# Patient Record
Sex: FEMALE | Race: WHITE
[De-identification: names, ages, dates, MRNs, and addresses within clinical notes are randomized per-mention and may not be internally consistent; named-entity substitution may affect disease eponyms.]

---

## 2018-11-29 ENCOUNTER — HOSPITAL ENCOUNTER (EMERGENCY)
Dept: HOSPITAL 76 - ED | Age: 23
Discharge: HOME | End: 2018-11-29
Payer: COMMERCIAL

## 2018-11-29 VITALS — DIASTOLIC BLOOD PRESSURE: 74 MMHG | SYSTOLIC BLOOD PRESSURE: 119 MMHG

## 2018-11-29 DIAGNOSIS — J05.0: Primary | ICD-10-CM

## 2018-11-29 PROCEDURE — 87430 STREP A AG IA: CPT

## 2018-11-29 PROCEDURE — 87070 CULTURE OTHR SPECIMN AEROBIC: CPT

## 2018-11-29 PROCEDURE — 99283 EMERGENCY DEPT VISIT LOW MDM: CPT

## 2018-11-29 NOTE — ED PHYSICIAN DOCUMENTATION
PD HPI HEENT





- Stated complaint


Stated Complaint: SORE THROAT





- Chief complaint


Chief Complaint: Heent





- History obtained from


History obtained from: Patient





- History of Present Illness


Timing - onset: How many days ago (5 days ill, worse the past day or so)


Timing - duration: Days


Timing - details: Gradual onset, Still present (worse the past day)


Location: Sinuses, Throat


Associated symptoms: Congestion, Cough, Other (sore throat).  No: Fever, Swollen

nodes


Recently seen: Not recently seen





Review of Systems


Constitutional: reports: Myalgias, Fatigue.  denies: Fever, Chills


Nose: reports: Rhinorrhea / runny nose, Congestion


Throat: reports: Sore throat.  denies: Swollen tonsils


Respiratory: reports: Cough.  denies: Dyspnea


GI: denies: Nausea, Vomiting, Diarrhea


Skin: denies: Rash





PD PAST MEDICAL HISTORY





- Past Medical History


Past Medical History: No


Cardiovascular: None


Respiratory: None


Neuro: None


Endocrine/Autoimmune: None


GI: None


GYN: None


: None


HEENT: None


Psych: None


Musculoskeletal: None


Derm: None





- Past Surgical History


Past Surgical History: No





- Present Medications


Home Medications: 


                                Ambulatory Orders











 Medication  Instructions  Recorded  Confirmed


 


Benzonatate [Tessalon Perle] 100 - 200 mg PO TID PRN #30 capsule 11/29/18 


 


Dexamethasone [Decadron] 4 mg PO DAILY #5 tablet 11/29/18 


 


Naproxen 375 mg PO BID #20 tablet 11/29/18 














- Allergies


Allergies/Adverse Reactions: 


                                    Allergies











Allergy/AdvReac Type Severity Reaction Status Date / Time


 


No Known Drug Allergies Allergy   Verified 11/29/18 11:16














- Social History


Does the pt smoke?: No


Smoking Status: Never smoker


Does the pt drink ETOH?: No


Does the pt have substance abuse?: No





- Immunizations


Immunizations are current?: Yes





- POLST


Patient has POLST: No





PD ED PE NORMAL





- Vitals


Vital signs reviewed: Yes





- General


General: Alert and oriented X 3, Well developed/nourished





- HEENT


HEENT: Ears normal, Moist mucous membranes.  No: Pharynx benign (some redness 

and swelling of tonsils, but only moderate and no real exudate. Minimal anterior

 adenopathy. )





- Neck


Neck: Supple, no meningeal sign





- Cardiac


Cardiac: RRR, No murmur





- Respiratory


Respiratory: Clear bilaterally





- Abdomen


Abdomen: Soft, Non tender





- Derm


Derm: Normal color, Warm and dry, No rash





- Neuro


Neuro: Alert and oriented X 3, No motor deficit, Normal speech





Results





- Vitals


Vitals: 


                                     Oxygen











O2 Source                      Room air

















- Labs


Labs: 


                                  Microbiology











 11/29/18 11:17 Group A Strep Throat Culture - Final





 Throat    Beta Hemolytic Strep Group A








                                Laboratory Tests











  11/29/18





  11:17


 


Group A Strep Rapid  Negative














PD MEDICAL DECISION MAKING





- ED course


Complexity details: reviewed results (negative strep test), considered 

differential, d/w patient





Departure





- Departure


Disposition: 01 Home, Self Care


Clinical Impression: 


Upper respiratory infection


Qualifiers:


 URI type: croup Qualified Code(s): J05.0 - Acute obstructive laryngitis [croup]





Condition: Stable


Record reviewed to determine appropriate education?: Yes


Instructions:  ED Upper Resp Infec No  Abx Tx


Follow-Up: 


[facility] [Provider Group]


Prescriptions: 


Benzonatate [Tessalon Perle] 100 - 200 mg PO TID PRN #30 capsule


 PRN Reason: Cough


Dexamethasone [Decadron] 4 mg PO DAILY #5 tablet


Naproxen 375 mg PO BID #20 tablet


Comments: 


Rest today and tomorrow.  Drink lots of fluids.  Naproxen twice daily for the 

next 7-10 days.  Add Tylenol if needed for fevers or pains.  Decadron steroid 

will help with the inflammation through the throat and airway [place] coughing 

and less hurting.  Tessalon if needed for cough.  Recheck if not improving well 

over the next few days.


Forms:  Activity restrictions


Discharge Date/Time: 11/29/18 12:57

## 2019-07-03 ENCOUNTER — HOSPITAL ENCOUNTER (EMERGENCY)
Dept: HOSPITAL 76 - ED | Age: 24
Discharge: HOME | End: 2019-07-03
Payer: COMMERCIAL

## 2019-07-03 VITALS — SYSTOLIC BLOOD PRESSURE: 127 MMHG | DIASTOLIC BLOOD PRESSURE: 63 MMHG

## 2019-07-03 DIAGNOSIS — R19.7: ICD-10-CM

## 2019-07-03 DIAGNOSIS — R11.2: Primary | ICD-10-CM

## 2019-07-03 PROCEDURE — 99283 EMERGENCY DEPT VISIT LOW MDM: CPT

## 2019-07-03 NOTE — ED PHYSICIAN DOCUMENTATION
PD HPI NVD





- Stated complaint


Stated Complaint: N/V/D





- Chief complaint


Chief Complaint: Abd Pain





- History obtained from


History obtained from: Patient





- History of Present Illness


Timing - onset: Last night


Timing - duration: Days (1/2)


Timing - details: Abrupt onset, Still present


Associated symptoms: Abdominal pain (just muscular when vomiting; no consistent 

pains), Loss of appetite (The patient started with nausea and vomiting last 

night after dinner and this continued through the night.  He has tapered a 

little bit into this morning.  She has had some diarrhea as well.  She has 

muscular abdominal pain with vomiting but no ongoing abdominal pain.  She wanted

to rest at home today and went to [facility] to get a SI Q excuse but they 

were too busy and referred her to the ER.).  No: Near syncope / syncope, Weight 

loss, Dysuria


Contributing factors: No: Sick contact, Bad food, Recent antibiotics


Improved by: No: Vomiting


Worsened by: Eating


Similar symptoms before: Has not had sx before


Recently seen: Clinic (She went to [facility] but they were busy and said they

could not see her to give her first sick excuse and referred her to the ER.)





Review of Systems


Constitutional: reports: Myalgias.  denies: Fever, Chills


Nose: denies: Rhinorrhea / runny nose, Congestion


Throat: denies: Sore throat


Respiratory: denies: Cough


GI: reports: Nausea, Vomiting, Diarrhea.  denies: Hematemesis, Bloody / black 

stool


Skin: denies: Rash, Lesions


Neurologic: denies: Near syncope, Altered mental status, Headache





PD PAST MEDICAL HISTORY





- Past Medical History


Cardiovascular: None


Respiratory: None


Neuro: None


Endocrine/Autoimmune: None


GI: None


GYN: None


: None


HEENT: None


Psych: None


Musculoskeletal: None


Derm: None





- Past Surgical History


Past Surgical History: No





- Present Medications


Home Medications: 


                                Ambulatory Orders











 Medication  Instructions  Recorded  Confirmed


 


Diphenoxylate/Atropine [Lomotil] 1 each PO QID PRN #12 tablet 07/03/19 


 


Ondansetron Odt [Zofran] 4 mg TL Q6H PRN #10 tablet 07/03/19 


 


RX: Naproxen 375 mg PO BID #20 tablet 07/03/19 














- Allergies


Allergies/Adverse Reactions: 


                                    Allergies











Allergy/AdvReac Type Severity Reaction Status Date / Time


 


No Known Drug Allergies Allergy   Verified 07/03/19 08:02














- Social History


Does the pt smoke?: No


Smoking Status: Never smoker


Does the pt drink ETOH?: No


Does the pt have substance abuse?: No





- Immunizations


Immunizations are current?: Yes





- POLST


Patient has POLST: No





PD ED PE NORMAL





- Vitals


Vital signs reviewed: Yes





- General


General: Alert and oriented X 3, No acute distress, Well developed/nourished





- HEENT


HEENT: Pharynx benign.  No: Moist mucous membranes





- Neck


Neck: Supple, no meningeal sign, No adenopathy





- Cardiac


Cardiac: RRR, No murmur





- Respiratory


Respiratory: Clear bilaterally





- Abdomen


Abdomen: Soft, Non tender, Non distended, No organomegaly.  No: Normal bowel 

sounds (increased)





- Back


Back: No CVA TTP





- Derm


Derm: Normal color, Warm and dry





- Neuro


Neuro: Alert and oriented X 3, No motor deficit, Normal speech





Results





- Vitals


Vitals: 


                               Vital Signs - 24 hr











  07/03/19





  08:00


 


Temperature 36.5 C


 


Heart Rate 84


 


Respiratory 16





Rate 


 


Blood Pressure 127/63


 


O2 Saturation 99








                                     Oxygen











O2 Source                      Room air

















PD MEDICAL DECISION MAKING





- ED course


Complexity details: considered differential (Sounds likely to be a viral 

gastroenteritis or food poisoning.  She has no abdominal tenderness.  The 

patient does not feel dehydrated enough to warrant IVs as offered.  She would 

prefer oral medications and being able to go home and rest.  We can do that.), 

d/w patient





Departure





- Departure


Disposition: 01 Home, Self Care


Clinical Impression: 


 Nausea vomiting and diarrhea





Condition: Stable


Record reviewed to determine appropriate education?: Yes


Health Concerns: 


vomiting and diarrhea


Plan of Treatment: 


medications and time


Care Goals: 


improve symptoms


Assessment: 


likely viral GE


Instructions:  ED Gastroenteritis Vs Food Poison


Follow-Up: 


[facility] [Provider Group]


Prescriptions: 


Diphenoxylate/Atropine [Lomotil] 1 each PO QID PRN #12 tablet


 PRN Reason: Diarrhea


RX: Naproxen 375 mg PO BID #20 tablet


Ondansetron Odt [Zofran] 4 mg TL Q6H PRN #10 tablet


 PRN Reason: Nausea / Vomiting


Comments: 


Off work today. Meds for nausea and diarrhea as needed. Naproxen for 

pains/fevers. 


Forms:  Activity restrictions


Discharge Date/Time: 07/03/19 08:40

## 2021-03-07 ENCOUNTER — HOSPITAL ENCOUNTER (EMERGENCY)
Dept: HOSPITAL 76 - ED | Age: 26
Discharge: HOME | End: 2021-03-07
Payer: COMMERCIAL

## 2021-03-07 VITALS — SYSTOLIC BLOOD PRESSURE: 110 MMHG | DIASTOLIC BLOOD PRESSURE: 66 MMHG

## 2021-03-07 DIAGNOSIS — O21.1: Primary | ICD-10-CM

## 2021-03-07 DIAGNOSIS — Z3A.01: ICD-10-CM

## 2021-03-07 LAB
ALBUMIN DIAFP-MCNC: 4.3 G/DL (ref 3.2–5.5)
ALBUMIN/GLOB SERPL: 1.5 {RATIO} (ref 1–2.2)
ALP SERPL-CCNC: 40 IU/L (ref 42–121)
ALT SERPL W P-5'-P-CCNC: 14 IU/L (ref 10–60)
ANION GAP SERPL CALCULATED.4IONS-SCNC: 9 MMOL/L (ref 6–13)
AST SERPL W P-5'-P-CCNC: 14 IU/L (ref 10–42)
BASOPHILS NFR BLD AUTO: 0 10^3/UL (ref 0–0.1)
BASOPHILS NFR BLD AUTO: 0.3 %
BILIRUB BLD-MCNC: 0.7 MG/DL (ref 0.2–1)
BUN SERPL-MCNC: 11 MG/DL (ref 6–20)
CALCIUM UR-MCNC: 9.3 MG/DL (ref 8.5–10.3)
CHLORIDE SERPL-SCNC: 101 MMOL/L (ref 101–111)
CLARITY UR REFRACT.AUTO: CLEAR
CO2 SERPL-SCNC: 23 MMOL/L (ref 21–32)
CREAT SERPLBLD-SCNC: 0.6 MG/DL (ref 0.4–1)
EOSINOPHIL # BLD AUTO: 0.1 10^3/UL (ref 0–0.7)
EOSINOPHIL NFR BLD AUTO: 1 %
ERYTHROCYTE [DISTWIDTH] IN BLOOD BY AUTOMATED COUNT: 12.2 % (ref 12–15)
GFRSERPLBLD MDRD-ARVRAT: 123 ML/MIN/{1.73_M2} (ref 89–?)
GLOBULIN SER-MCNC: 2.9 G/DL (ref 2.1–4.2)
GLUCOSE SERPL-MCNC: 91 MG/DL (ref 70–100)
GLUCOSE UR QL STRIP.AUTO: NEGATIVE MG/DL
HCT VFR BLD AUTO: 40 % (ref 37–47)
HGB UR QL STRIP: 13.7 G/DL (ref 12–16)
KETONES UR QL STRIP.AUTO: NEGATIVE MG/DL
LIPASE SERPL-CCNC: 28 U/L (ref 22–51)
LYMPHOCYTES # SPEC AUTO: 2.5 10^3/UL (ref 1.5–3.5)
LYMPHOCYTES NFR BLD AUTO: 25.3 %
MCH RBC QN AUTO: 30.6 PG (ref 27–31)
MCHC RBC AUTO-ENTMCNC: 34.3 G/DL (ref 32–36)
MCV RBC AUTO: 89.5 FL (ref 81–99)
MONOCYTES # BLD AUTO: 0.6 10^3/UL (ref 0–1)
MONOCYTES NFR BLD AUTO: 6 %
NEUTROPHILS # BLD AUTO: 6.6 10^3/UL (ref 1.5–6.6)
NEUTROPHILS # SNV AUTO: 9.8 X10^3/UL (ref 4.8–10.8)
NEUTROPHILS NFR BLD AUTO: 67 %
NITRITE UR QL STRIP.AUTO: NEGATIVE
NRBC # BLD AUTO: 0 /100WBC
NRBC # BLD AUTO: 0 X10^3/UL
PDW BLD AUTO: 10.1 FL (ref 7.9–10.8)
PH UR STRIP.AUTO: 6.5 PH (ref 5–7.5)
PLATELET # BLD: 285 10^3/UL (ref 130–450)
POTASSIUM SERPL-SCNC: 3.9 MMOL/L (ref 3.5–5)
PROT SPEC-MCNC: 7.2 G/DL (ref 6.7–8.2)
PROT UR STRIP.AUTO-MCNC: NEGATIVE MG/DL
RBC # UR STRIP.AUTO: (no result) /UL
RBC MAR: 4.47 10^6/UL (ref 4.2–5.4)
SODIUM SERPLBLD-SCNC: 133 MMOL/L (ref 135–145)
SP GR UR STRIP.AUTO: 1.01 (ref 1–1.03)
UROBILINOGEN UR QL STRIP.AUTO: (no result) E.U./DL
UROBILINOGEN UR STRIP.AUTO-MCNC: NEGATIVE MG/DL

## 2021-03-07 PROCEDURE — 80053 COMPREHEN METABOLIC PANEL: CPT

## 2021-03-07 PROCEDURE — 87086 URINE CULTURE/COLONY COUNT: CPT

## 2021-03-07 PROCEDURE — 99284 EMERGENCY DEPT VISIT MOD MDM: CPT

## 2021-03-07 PROCEDURE — 96374 THER/PROPH/DIAG INJ IV PUSH: CPT

## 2021-03-07 PROCEDURE — 81025 URINE PREGNANCY TEST: CPT

## 2021-03-07 PROCEDURE — 81003 URINALYSIS AUTO W/O SCOPE: CPT

## 2021-03-07 PROCEDURE — 99283 EMERGENCY DEPT VISIT LOW MDM: CPT

## 2021-03-07 PROCEDURE — 85025 COMPLETE CBC W/AUTO DIFF WBC: CPT

## 2021-03-07 PROCEDURE — 81001 URINALYSIS AUTO W/SCOPE: CPT

## 2021-03-07 PROCEDURE — 83690 ASSAY OF LIPASE: CPT

## 2021-03-07 PROCEDURE — 36415 COLL VENOUS BLD VENIPUNCTURE: CPT

## 2021-03-07 NOTE — ED PHYSICIAN DOCUMENTATION
PD HPI ABD PAIN





- Stated complaint


Stated Complaint: VOMITING FOR 2 WEEKS





- Chief complaint


Chief Complaint: Abd Pain





- History obtained from


History obtained from: Patient (25-year-old G3, P0 at 7 weeks gestation presents

with 2 weeks of worsening vomiting and nausea.  Developed some left upper 

quadrant pain today.  No pelvic pain, cramping or bleeding.  She has had similar

problems with prior pregnancies.)





Review of Systems


Ten Systems: 10 systems reviewed and negative


Constitutional: denies: Fever, Chills


Cardiac: denies: Chest pain / pressure, Palpitations


Respiratory: denies: Dyspnea, Cough





PD PAST MEDICAL HISTORY





- Past Medical History


Cardiovascular: None


Respiratory: None


Neuro: None


Endocrine/Autoimmune: None


GI: None


GYN: None


: None


HEENT: None


Psych: None


Musculoskeletal: None


Derm: None





- Past Surgical History


Past Surgical History: No





- Present Medications


Home Medications: 


                                Ambulatory Orders











 Medication  Instructions  Recorded  Confirmed


 


Diphenoxylate/Atropine [Lomotil] 1 each PO QID PRN #12 tablet 07/03/19 


 


Naproxen 375 mg PO BID #20 tablet 07/03/19 


 


Ondansetron Odt [Zofran] 4 mg TL Q6H PRN #10 tablet 07/03/19 


 


Metoclopramide [Reglan] 10 mg PO Q6H PRN #20 tablet 03/07/21 














- Allergies


Allergies/Adverse Reactions: 


                                    Allergies











Allergy/AdvReac Type Severity Reaction Status Date / Time


 


No Known Drug Allergies Allergy   Verified 07/03/19 08:02














- Social History


Does the pt smoke?: No


Smoking Status: Never smoker


Does the pt drink ETOH?: No


Does the pt have substance abuse?: No





- Immunizations


Immunizations are current?: Yes





- POLST


Patient has POLST: No





PD ED PE NORMAL





- Vitals


Vital signs reviewed: Yes





- General


General: Alert and oriented X 3, No acute distress





- HEENT


HEENT: PERRL, EOMI





- Neck


Neck: Supple, no meningeal sign, No bony TTP





- Cardiac


Cardiac: RRR, No murmur





- Respiratory


Respiratory: No respiratory distress, Clear bilaterally





- Abdomen


Abdomen: Normal bowel sounds, Soft, Non tender, Other (Bedside ultrasound 

demonstrates single live intrauterine pregnancy with heart rate of 146.)





- Back


Back: No CVA TTP, No spinal TTP





- Derm


Derm: Normal color, Warm and dry





- Extremities


Extremities: No edema, No calf tenderness / cord





- Neuro


Neuro: Alert and oriented X 3, Normal speech





Results





- Vitals


Vitals: 


                               Vital Signs - 24 hr











  03/07/21





  18:00


 


Temperature 37 C


 


Heart Rate 85


 


Respiratory 16





Rate 


 


Blood Pressure 125/66


 


O2 Saturation 100








                                     Oxygen











O2 Source                      Room air

















- Labs


Labs: 


                                Laboratory Tests











  03/07/21 03/07/21 03/07/21





  18:21 18:21 18:21


 


WBC  9.8  


 


RBC  4.47  


 


Hgb  13.7  


 


Hct  40.0  


 


MCV  89.5  


 


MCH  30.6  


 


MCHC  34.3  


 


RDW  12.2  


 


Plt Count  285  


 


MPV  10.1  


 


Neut # (Auto)  6.6  


 


Lymph # (Auto)  2.5  


 


Mono # (Auto)  0.6  


 


Eos # (Auto)  0.1  


 


Baso # (Auto)  0.0  


 


Absolute Nucleated RBC  0.00  


 


Nucleated RBC %  0.0  


 


Sodium   133 L 


 


Potassium   3.9 


 


Chloride   101 


 


Carbon Dioxide   23 


 


Anion Gap   9.0 


 


BUN   11 


 


Creatinine   0.6 


 


Estimated GFR (MDRD)   123 


 


Glucose   91 


 


Calcium   9.3 


 


Total Bilirubin   0.7 


 


AST   14 


 


ALT   14 


 


Alkaline Phosphatase   40 L 


 


Total Protein   7.2 


 


Albumin   4.3 


 


Globulin   2.9 


 


Albumin/Globulin Ratio   1.5 


 


Lipase   28 


 


Urine Color    YELLOW


 


Urine Clarity    CLEAR


 


Urine pH    6.5


 


Ur Specific Gravity    1.015


 


Urine Protein    NEGATIVE


 


Urine Glucose (UA)    NEGATIVE


 


Urine Ketones    NEGATIVE


 


Urine Occult Blood    TRACE-INTA


 


Urine Nitrite    NEGATIVE


 


Urine Bilirubin    NEGATIVE


 


Urine Urobilinogen    0.2 (NORMAL)


 


Ur Leukocyte Esterase    NEGATIVE


 


Ur Microscopic Review    NOT INDICATED


 


Urine Culture Comments    NOT INDICATED














PD MEDICAL DECISION MAKING





- ED course


ED course: 





25-year-old woman presents with hyperemesis gravidarum.  She has some left upper

 quadrant pain which I suspect is from retching given benign exam.  No pelvic 

pain, bleeding, fluid loss.  Reassuring bedside ultrasound.  After the 

administration of IV fluids and Reglan she was symptom-free.





Departure





- Departure


Disposition: 01 Home, Self Care


Clinical Impression: 


 Hyperemesis gravidarum, Dehydration





Condition: Good


Record reviewed to determine appropriate education?: Yes


Instructions:  ED Preg Morning Sickness


Prescriptions: 


Metoclopramide [Reglan] 10 mg PO Q6H PRN #20 tablet


 PRN Reason: nausea or headache


Comments: 


Call your doctor to arrange a follow-up appointment, make the next available 

appointment.  In the interim, return anytime if worse or if new symptoms 

develop.

## 2021-03-18 ENCOUNTER — HOSPITAL ENCOUNTER (EMERGENCY)
Dept: HOSPITAL 76 - ED | Age: 26
LOS: 1 days | Discharge: HOME | End: 2021-03-19
Payer: COMMERCIAL

## 2021-03-18 DIAGNOSIS — Z3A.00: ICD-10-CM

## 2021-03-18 DIAGNOSIS — O21.0: Primary | ICD-10-CM

## 2021-03-18 LAB
ALBUMIN DIAFP-MCNC: 3.7 G/DL (ref 3.2–5.5)
ALBUMIN/GLOB SERPL: 1.5 {RATIO} (ref 1–2.2)
ALP SERPL-CCNC: 39 IU/L (ref 42–121)
ALT SERPL W P-5'-P-CCNC: 15 IU/L (ref 10–60)
ANION GAP SERPL CALCULATED.4IONS-SCNC: 12 MMOL/L (ref 6–13)
AST SERPL W P-5'-P-CCNC: 16 IU/L (ref 10–42)
BASOPHILS NFR BLD AUTO: 0 10^3/UL (ref 0–0.1)
BASOPHILS NFR BLD AUTO: 0.3 %
BILIRUB BLD-MCNC: 0.5 MG/DL (ref 0.2–1)
BUN SERPL-MCNC: 9 MG/DL (ref 6–20)
CALCIUM UR-MCNC: 8.8 MG/DL (ref 8.5–10.3)
CHLORIDE SERPL-SCNC: 105 MMOL/L (ref 101–111)
CLARITY UR REFRACT.AUTO: CLEAR
CO2 SERPL-SCNC: 20 MMOL/L (ref 21–32)
CREAT SERPLBLD-SCNC: 0.6 MG/DL (ref 0.4–1)
EOSINOPHIL # BLD AUTO: 0.1 10^3/UL (ref 0–0.7)
EOSINOPHIL NFR BLD AUTO: 0.8 %
ERYTHROCYTE [DISTWIDTH] IN BLOOD BY AUTOMATED COUNT: 12.3 % (ref 12–15)
GFRSERPLBLD MDRD-ARVRAT: 123 ML/MIN/{1.73_M2} (ref 89–?)
GLOBULIN SER-MCNC: 2.5 G/DL (ref 2.1–4.2)
GLUCOSE SERPL-MCNC: 89 MG/DL (ref 70–100)
GLUCOSE UR QL STRIP.AUTO: NEGATIVE MG/DL
HCT VFR BLD AUTO: 37.7 % (ref 37–47)
HGB UR QL STRIP: 12.9 G/DL (ref 12–16)
KETONES UR QL STRIP.AUTO: NEGATIVE MG/DL
LIPASE SERPL-CCNC: 24 U/L (ref 22–51)
LYMPHOCYTES # SPEC AUTO: 1.9 10^3/UL (ref 1.5–3.5)
LYMPHOCYTES NFR BLD AUTO: 18.8 %
MCH RBC QN AUTO: 31 PG (ref 27–31)
MCHC RBC AUTO-ENTMCNC: 34.2 G/DL (ref 32–36)
MCV RBC AUTO: 90.6 FL (ref 81–99)
MONOCYTES # BLD AUTO: 0.5 10^3/UL (ref 0–1)
MONOCYTES NFR BLD AUTO: 5.5 %
NEUTROPHILS # BLD AUTO: 7.3 10^3/UL (ref 1.5–6.6)
NEUTROPHILS # SNV AUTO: 9.9 X10^3/UL (ref 4.8–10.8)
NEUTROPHILS NFR BLD AUTO: 74.3 %
NITRITE UR QL STRIP.AUTO: NEGATIVE
NRBC # BLD AUTO: 0 /100WBC
NRBC # BLD AUTO: 0 X10^3/UL
PDW BLD AUTO: 10.3 FL (ref 7.9–10.8)
PH UR STRIP.AUTO: 8.5 PH (ref 5–7.5)
PLATELET # BLD: 261 10^3/UL (ref 130–450)
POTASSIUM SERPL-SCNC: 3.8 MMOL/L (ref 3.5–5)
PROT SPEC-MCNC: 6.2 G/DL (ref 6.7–8.2)
PROT UR STRIP.AUTO-MCNC: NEGATIVE MG/DL
RBC # UR STRIP.AUTO: NEGATIVE /UL
RBC MAR: 4.16 10^6/UL (ref 4.2–5.4)
SODIUM SERPLBLD-SCNC: 137 MMOL/L (ref 135–145)
SP GR UR STRIP.AUTO: 1.02 (ref 1–1.03)
UROBILINOGEN UR QL STRIP.AUTO: (no result) E.U./DL
UROBILINOGEN UR STRIP.AUTO-MCNC: NEGATIVE MG/DL

## 2021-03-18 PROCEDURE — 99283 EMERGENCY DEPT VISIT LOW MDM: CPT

## 2021-03-18 PROCEDURE — 81003 URINALYSIS AUTO W/O SCOPE: CPT

## 2021-03-18 PROCEDURE — 96361 HYDRATE IV INFUSION ADD-ON: CPT

## 2021-03-18 PROCEDURE — 83690 ASSAY OF LIPASE: CPT

## 2021-03-18 PROCEDURE — 80053 COMPREHEN METABOLIC PANEL: CPT

## 2021-03-18 PROCEDURE — 87086 URINE CULTURE/COLONY COUNT: CPT

## 2021-03-18 PROCEDURE — 81001 URINALYSIS AUTO W/SCOPE: CPT

## 2021-03-18 PROCEDURE — 96375 TX/PRO/DX INJ NEW DRUG ADDON: CPT

## 2021-03-18 PROCEDURE — 96374 THER/PROPH/DIAG INJ IV PUSH: CPT

## 2021-03-18 PROCEDURE — 85025 COMPLETE CBC W/AUTO DIFF WBC: CPT

## 2021-03-18 NOTE — ED PHYSICIAN DOCUMENTATION
History of Present Illness





- Stated complaint


Stated Complaint: NAUSEA





- Chief complaint


Chief Complaint: Abd Pain





- Additonal information


Additional information: 


25-year-old female presents the emergency department for uncontrolled nausea and

vomiting in first trimester of pregnancy.





LMP 1/7/2021.  G3, PO  Patient is scheduled to see the OB clinic tomorrow to 

establish care.  Patient reports that she has lost both of her previous 2 preg

nancies secondary to hyperemesis gravidarum.





She reports that with each of her pregnancies she has had severe nausea and 

vomiting.  She was seen for similar 03/07/21 by my colleague.  Screening labs 

were obtained.  Symptom control was achieved in the emergency department with 

Reglan and she was given a prescription on discharge.  She reports that the 

Reglan did not help with the nausea at home and she did follow-up with the [facility] 

women's clinic where they prescribed Zofran ODT.  She had been doing okay on 

Zofran until this evening when the vomiting began again.  She is concerned 

because she is vomited up bright red blood and coffee-ground.  She denies 

vaginal bleeding or discharge but does report that her urine smells bad.








Review of Systems


Constitutional: denies: Fever


Eyes: reports: Reviewed and negative


Ears: reports: Reviewed and negative


Nose: reports: Reviewed and negative


Throat: reports: Reviewed and negative


Cardiac: reports: Reviewed and negative


Respiratory: denies: Dyspnea, Cough


GI: reports: Nausea, Vomiting.  denies: Constipation, Diarrhea, Hematemesis


: reports: LMP (01/07/21).  denies: Dysuria, Discharge, Vaginal bleeding


Skin: denies: Rash, Lesions


Musculoskeletal: denies: Neck pain, Back pain





PD PAST MEDICAL HISTORY





- Past Medical History


Cardiovascular: None


Respiratory: None


Neuro: None


Endocrine/Autoimmune: None


GI: None


GYN: None


: None


HEENT: None


Psych: None


Musculoskeletal: None


Derm: None





- Past Surgical History


Past Surgical History: No





- Present Medications


Home Medications: 


                                Ambulatory Orders











 Medication  Instructions  Recorded  Confirmed


 


Ondansetron Odt [Zofran] 4 mg TL Q6H PRN #10 tablet 07/03/19 03/18/21














- Allergies


Allergies/Adverse Reactions: 


                                    Allergies











Allergy/AdvReac Type Severity Reaction Status Date / Time


 


Penicillins Allergy  Anaphylaxis Verified 03/18/21 21:12














- Social History


Does the pt smoke?: No


Smoking Status: Never smoker


Does the pt drink ETOH?: No


Does the pt have substance abuse?: No





- Immunizations


Immunizations are current?: Yes





- POLST


Patient has POLST: No





PD ED PE EXPANDED





- General


General: Alert, In distress (nauseated and actively vomiting)





- HEENT


HEENT: Atraumatic, PERRL, Dry mucous membranes





- Neck


Neck: Supple w/out meningeal sx.  No: Adenopathy





- Cardiac


Cardiac: Regular Rate, Regular Rhythm, Radial strong equal, Cap refill < 2 sec. 

 No: Murmur Present





- Respiratory


Respiratory: Clear to ausultation ranjeet.  No: Distress, Labored





- Abdomen


Abdomen: Normal Bowel sounds.  No: Tender to palpation (Generalized nonfocal 

tenderness.)





- Derm


Derm: Normal color, Warm and dry





- Neuro


Neuro: Alert and Oriented X 3, CNII-XII intact





- GCS


Eye Opening: Spontaneous


Motor: Obeys Commands


Verbal: Oriented


Total: 15





Results





- Vitals


Vitals: 


                               Vital Signs - 24 hr











  03/18/21 03/18/21





  21:09 21:12


 


Temperature 37.3 C 37.3 C


 


Heart Rate 89 89


 


Respiratory 16 16





Rate  


 


Blood Pressure 124/59 L 124/60


 


O2 Saturation 100 100








                                     Oxygen











O2 Source                      Room air

















- Labs


Labs: 


                                Laboratory Tests











  03/18/21 03/18/21





  21:38 22:00


 


WBC   9.9


 


RBC   4.16 L


 


Hgb   12.9


 


Hct   37.7


 


MCV   90.6


 


MCH   31.0


 


MCHC   34.2


 


RDW   12.3


 


Plt Count   261


 


MPV   10.3


 


Neut # (Auto)   7.3 H


 


Lymph # (Auto)   1.9


 


Mono # (Auto)   0.5


 


Eos # (Auto)   0.1


 


Baso # (Auto)   0.0


 


Absolute Nucleated RBC   0.00


 


Nucleated RBC %   0.0


 


Urine Color  YELLOW 


 


Urine Clarity  CLEAR 


 


Urine pH  8.5 H 


 


Ur Specific Gravity  1.020 


 


Urine Protein  NEGATIVE 


 


Urine Glucose (UA)  NEGATIVE 


 


Urine Ketones  NEGATIVE 


 


Urine Occult Blood  NEGATIVE 


 


Urine Nitrite  NEGATIVE 


 


Urine Bilirubin  NEGATIVE 


 


Urine Urobilinogen  0.2 (NORMAL) 


 


Ur Leukocyte Esterase  NEGATIVE 


 


Ur Microscopic Review  NOT INDICATED 


 


Urine Culture Comments  NOT INDICATED 














PD MEDICAL DECISION MAKING





- ED course


Complexity details: reviewed results, re-evaluated patient, d/w patient


ED course: 





25-year-old female presents the emergency department with uncontrolled nausea 

and vomiting in first trimester pregnancy.  She has been taking Reglan and 

Zofran at home without relief of symptoms.  Her nausea is especially bad when 

standing or ambulating at all.  Screening labs are pending for this patient but 

I have initiated treatment with 2 L of crystalloid as well as Zofran and Reglan.

  Patient reports that the initial dose of Zofran has helped her symptoms but 

she has a lot of throat pain from retching forcefully.  I will order viscous 

lidocaine.





Patient will be signed out to my nocturnal colleague Dr. Choudhury to follow-up on 

screening labs and electrolytes and reevaluate symptoms.





Departure





- Departure


Clinical Impression: 


 Hyperemesis gravidarum

## 2021-03-19 ENCOUNTER — HOSPITAL ENCOUNTER (OUTPATIENT)
Dept: HOSPITAL 76 - LAB.R | Age: 26
Discharge: HOME | End: 2021-03-19
Attending: ADVANCED PRACTICE MIDWIFE
Payer: COMMERCIAL

## 2021-03-19 VITALS — DIASTOLIC BLOOD PRESSURE: 60 MMHG | SYSTOLIC BLOOD PRESSURE: 110 MMHG

## 2021-03-19 DIAGNOSIS — N89.8: ICD-10-CM

## 2021-03-19 DIAGNOSIS — Z32.01: Primary | ICD-10-CM

## 2021-03-19 LAB
AMPHET UR QL SCN: NEGATIVE
BARBITURATES UR QL SCN>300 NG/ML: NEGATIVE
BENZODIAZ UR QL SCN: NEGATIVE
BV DNA PNL VAG NAA+PROBE: POSITIVE
C GLABRATA DNA BLD QL NAA+PROBE: NEGATIVE
C KRUSEI DNA VAG QL NAA+PROBE: NEGATIVE
CANDIDA DNA VAG QL NAA+PROBE: NEGATIVE
CLARITY UR REFRACT.AUTO: CLEAR
COCAINE UR-SCNC: NEGATIVE UMOL/L
GLUCOSE UR QL STRIP.AUTO: NEGATIVE MG/DL
KETONES UR QL STRIP.AUTO: NEGATIVE MG/DL
METHADONE UR QL SCN: NEGATIVE
METHAMPHET UR QL SCN: NEGATIVE
NITRITE UR QL STRIP.AUTO: NEGATIVE
OPIATES UR QL SCN: NEGATIVE
PH UR STRIP.AUTO: 7.5 PH (ref 5–7.5)
PROT UR STRIP.AUTO-MCNC: NEGATIVE MG/DL
RBC # UR STRIP.AUTO: NEGATIVE /UL
SP GR UR STRIP.AUTO: 1.02 (ref 1–1.03)
SQUAMOUS URNS QL MICRO: (no result)
T VAGINALIS RRNA GENITAL QL PROBE: NEGATIVE
THC UR QL SCN: NEGATIVE
UROBILINOGEN UR QL STRIP.AUTO: (no result) E.U./DL
UROBILINOGEN UR STRIP.AUTO-MCNC: NEGATIVE MG/DL
VOLATILE DRUGS POS SERPL SCN: (no result)
WBC # UR MANUAL: (no result) /HPF (ref 0–5)

## 2021-03-19 PROCEDURE — 81001 URINALYSIS AUTO W/SCOPE: CPT

## 2021-03-19 PROCEDURE — 87661 TRICHOMONAS VAGINALIS AMPLIF: CPT

## 2021-03-19 PROCEDURE — 80306 DRUG TEST PRSMV INSTRMNT: CPT

## 2021-03-19 PROCEDURE — 87086 URINE CULTURE/COLONY COUNT: CPT

## 2021-03-19 PROCEDURE — 87801 DETECT AGNT MULT DNA AMPLI: CPT

## 2021-03-22 ENCOUNTER — HOSPITAL ENCOUNTER (OUTPATIENT)
Dept: HOSPITAL 76 - DI | Age: 26
Discharge: HOME | End: 2021-03-22
Attending: ADVANCED PRACTICE MIDWIFE
Payer: COMMERCIAL

## 2021-03-22 DIAGNOSIS — Z32.01: Primary | ICD-10-CM

## 2021-03-22 NOTE — ULTRASOUND REPORT
PROCEDURE:  OB First Trimester

 

INDICATIONS:  POS PREGNANCY TEST

 

OUTSIDE/PRIOR DATING DATA:  

Last menstrual period (LMP): 1/7/2021.  

LMP-based estimated date of delivery (ROHINI): 10/14/2021.  

First dating scan (date and location): 5/22/2021.  

Estimated date of delivery (ROHINI) from first dating scan: 10/18/2021.  

 

TECHNIQUE:  

Real-time scanning was performed of the fetus and maternal pelvic organs, with image documentation.  


 

COMPARISON:  None

 

FINDINGS:     

 

Embryo:  Signal of intrauterine pregnancy is identified with crown-rump length measuring 2.1 cm corre
sponding to 10 weeks 0 days. Fetal heart rate is identified at 169 bpm.

 

Measurement variability in dating:  +/- 4 weeks by LMP, +/- 7 days by mean sac diameter (use before 6
 weeks gestation if crown-rump length not able to be measured), +/- 5 days by crown-rump length (6-12
 weeks gestation).  

 

Maternal organs:  Ovaries are within normal limits..  

 

IMPRESSION:  

Single live intrauterine pregnancy with ultrasound gestational age of 10 weeks 0 days.

 

 

Reviewed by: Shey Conn MD on 3/22/2021 2:01 PM PDT

Approved by: Shey Conn MD on 3/22/2021 2:01 PM PDT

 

 

Station ID:  [id number]

## 2021-04-14 ENCOUNTER — HOSPITAL ENCOUNTER (OUTPATIENT)
Dept: HOSPITAL 76 - LAB.R | Age: 26
Discharge: HOME | End: 2021-04-14
Attending: ADVANCED PRACTICE MIDWIFE
Payer: COMMERCIAL

## 2021-04-14 DIAGNOSIS — Z34.90: Primary | ICD-10-CM

## 2021-04-14 LAB
C TRACH DNA SPEC NAA+PROBE-ACNC: NEGATIVE
N GONORRHOEA DNA GENITAL QL NAA+PROBE: NEGATIVE
T VAGINALIS RRNA GENITAL QL PROBE: NEGATIVE

## 2021-04-14 PROCEDURE — 87591 N.GONORRHOEAE DNA AMP PROB: CPT

## 2021-04-14 PROCEDURE — 87491 CHLMYD TRACH DNA AMP PROBE: CPT

## 2021-04-14 PROCEDURE — 87661 TRICHOMONAS VAGINALIS AMPLIF: CPT

## 2021-05-12 ENCOUNTER — HOSPITAL ENCOUNTER (OUTPATIENT)
Dept: HOSPITAL 76 - LAB | Age: 26
Discharge: HOME | End: 2021-05-12
Attending: RADIOLOGY
Payer: COMMERCIAL

## 2021-05-12 DIAGNOSIS — Z36.89: ICD-10-CM

## 2021-05-12 DIAGNOSIS — Z36.0: ICD-10-CM

## 2021-05-12 DIAGNOSIS — Z34.90: Primary | ICD-10-CM

## 2021-05-12 LAB
BASOPHILS NFR BLD AUTO: 0 10^3/UL (ref 0–0.1)
BASOPHILS NFR BLD AUTO: 0.2 %
EOSINOPHIL # BLD AUTO: 0 10^3/UL (ref 0–0.7)
EOSINOPHIL NFR BLD AUTO: 0.5 %
ERYTHROCYTE [DISTWIDTH] IN BLOOD BY AUTOMATED COUNT: 13.4 % (ref 12–15)
HCT VFR BLD AUTO: 37.6 % (ref 37–47)
HGB UR QL STRIP: 13 G/DL (ref 12–16)
LYMPHOCYTES # SPEC AUTO: 1.9 10^3/UL (ref 1.5–3.5)
LYMPHOCYTES NFR BLD AUTO: 22.8 %
MCH RBC QN AUTO: 31.1 PG (ref 27–31)
MCHC RBC AUTO-ENTMCNC: 34.6 G/DL (ref 32–36)
MCV RBC AUTO: 90 FL (ref 81–99)
MONOCYTES # BLD AUTO: 0.4 10^3/UL (ref 0–1)
MONOCYTES NFR BLD AUTO: 4.9 %
NEUTROPHILS # BLD AUTO: 5.8 10^3/UL (ref 1.5–6.6)
NEUTROPHILS # SNV AUTO: 8.2 X10^3/UL (ref 4.8–10.8)
NEUTROPHILS NFR BLD AUTO: 71.4 %
NRBC # BLD AUTO: 0 /100WBC
NRBC # BLD AUTO: 0 X10^3/UL
PDW BLD AUTO: 10 FL (ref 7.9–10.8)
PLATELET # BLD: 296 10^3/UL (ref 130–450)
RBC MAR: 4.18 10^6/UL (ref 4.2–5.4)

## 2021-05-12 PROCEDURE — 86592 SYPHILIS TEST NON-TREP QUAL: CPT

## 2021-05-12 PROCEDURE — 86850 RBC ANTIBODY SCREEN: CPT

## 2021-05-12 PROCEDURE — 36415 COLL VENOUS BLD VENIPUNCTURE: CPT

## 2021-05-12 PROCEDURE — 86762 RUBELLA ANTIBODY: CPT

## 2021-05-12 PROCEDURE — 87661 TRICHOMONAS VAGINALIS AMPLIF: CPT

## 2021-05-12 PROCEDURE — 86901 BLOOD TYPING SEROLOGIC RH(D): CPT

## 2021-05-12 PROCEDURE — 86803 HEPATITIS C AB TEST: CPT

## 2021-05-12 PROCEDURE — 81511 FTL CGEN ABNOR FOUR ANAL: CPT

## 2021-05-12 PROCEDURE — 85025 COMPLETE CBC W/AUTO DIFF WBC: CPT

## 2021-05-12 PROCEDURE — 86787 VARICELLA-ZOSTER ANTIBODY: CPT

## 2021-05-12 PROCEDURE — 87591 N.GONORRHOEAE DNA AMP PROB: CPT

## 2021-05-12 PROCEDURE — 87389 HIV-1 AG W/HIV-1&-2 AB AG IA: CPT

## 2021-05-12 PROCEDURE — 86900 BLOOD TYPING SEROLOGIC ABO: CPT

## 2021-05-12 PROCEDURE — 87491 CHLMYD TRACH DNA AMP PROBE: CPT

## 2021-05-12 PROCEDURE — 87340 HEPATITIS B SURFACE AG IA: CPT

## 2021-05-13 LAB
HEPATITIS B SURFACE ANTIGEN: (no result)
HEPATITIS C ANTIBODY: (no result)
HIV AG/AB 4TH GEN: (no result)
SIGNAL TO CUT-OFF: 0 (ref ?–1)

## 2021-05-14 LAB
AFP MOM: 0.97
AGE RISK DOWN SYNDROME: (no result)
CALC'D GESTATIONAL AGE: 17.9 WEEKS
CIGARETTE SMOKER?: (no result)
COMMENTS: (no result)
DONOR AGE: EGG RETRIEVAL: (no result)
DONOR EGG: NO
EDD DETERMINED BY: (no result)
ESTRIOL MOM: 0.69
HCG MOM: 0.6
HX OF NEURAL TUBE DEFECTS: NO
INHIBIN A MOM: 0.88
INSULIN DEPEND DIABETIC: NO
INTERPRETATION: (no result)
MATERNAL WEIGHT: 170 LBS
MSS DOWN SYNDROME RISK: (no result)
MSS3 TRISOMY 18 RISK: (no result)
NUMBER OF FETUSES: 1
PREV PREGNANCY DOWN SYND: NO
RISK FOR ONTD: (no result)

## 2021-05-28 ENCOUNTER — HOSPITAL ENCOUNTER (OUTPATIENT)
Dept: HOSPITAL 76 - DI | Age: 26
Discharge: HOME | End: 2021-05-28
Attending: ADVANCED PRACTICE MIDWIFE
Payer: COMMERCIAL

## 2021-05-28 DIAGNOSIS — Z34.00: Primary | ICD-10-CM

## 2021-05-28 DIAGNOSIS — Z36.0: ICD-10-CM

## 2021-05-28 NOTE — ULTRASOUND REPORT
PROCEDURE:  OB Detailed Fetal Eval

 

INDICATIONS:  SUPERVISION OF NORMAL PREGNANCY

 

OUTSIDE/PRIOR DATING DATA:  

Last menstrual period (LMP): 1/7/2021.  

LMP-based estimated date of delivery (ROHINI): 10/14/2021.  

First dating scan (date and location): 3/22/2021.  [facility].

Estimated date of delivery (ROHINI) from first dating scan: 10/18/2021.  

The below data below was generated using the ultrasound ROHINI of 10/18/2021

 

TECHNIQUE: 

Real-time scanning was performed of the fetus, with image documentation and biometric measurements.  


 

COMPARISON:  3/22/2021.

 

FINDINGS:  

 

General:  A single living intrauterine gestation is present.  

Presentation:  Vertex

Placenta:  Placental position is posterior, without previa.  

Amniotic fluid index:  14.2 cm,  normal for gestational age.  Largest pocket 4.2 cm.

Fetal heart rate:  137 beats per minute.  

Maternal cervical canal:  3.2 cm long; normal length is 2.5 cm or more.  

 

Fetal biometrics:  

Biparietal diameter:  4.6 cm, 19 weeks 6 days

Head circumference:  16.6 cm, 19 weeks 2 days

Abdominal circumference:  14.5 cm, 19 weeks 6 days

Femur length:  2.8 cm, 18 weeks 4 days

 

Estimated gestational age from initial scan: 19 weeks 4 days.  

Composite gestational age from present scan:  19 weeks 3 days

Estimated fetal weight and percentile:  282 g, 27th percentile

 

Measurement variability in biometric dating: +/- 10 days from 12-20 weeks gestation, +/- 2 weeks from
 20-30 weeks gestation, +/- 3 weeks at 30 weeks gestation or later.  

 

Anatomic survey:  

Neuro:  Ventricles are normal at less than 10 mm.  Cisterna magna is normal at 3-11 mm.  Cerebellum i
s normal in size and morphology.  

Nuchal skin fold:  Normal at less than 6 mm between 14 and 20 weeks gestational age.  

Face:  Nose and lips, facial profile are normal.  

Spine:  No evidence for spina bifida.  

Heart:  4-chambered heart is present, with normal ventricular outflow tracts.  

Diaphragm:  Diaphragm is intact.  

Stomach:  Left-sided stomach is present.  

Kidneys:  No fetal hydronephrosis.  Normal is less than 5 mm in 2nd trimester, less than 7 mm in 3rd 
trimester.  

Cord:  3 vessel cord has orthotopic insertion.  

Bladder:  Normal in size.  

Extremities:  All 4 extremities are visualized.  

 

IMPRESSION:  

 

1. Nassar living intrauterine pregnancy at 19 weeks 3 days based on today's ultrasound. This is co
ncordant with the first trimester ultrasound. Fetus is in the 27th percentile for weight.

 

2. Normal placenta and amniotic fluid.

 

3. Normal and complete fetal anatomic survey.

 

Reviewed by: Nasir Aponte MD on 5/28/2021 11:12 PM PDT

Approved by: Nasir Aponte MD on 5/28/2021 11:12 PM PDT

 

 

Station ID:  [id number]

## 2021-06-04 ENCOUNTER — HOSPITAL ENCOUNTER (OUTPATIENT)
Dept: HOSPITAL 76 - WFO | Age: 26
Discharge: HOME | End: 2021-06-04
Attending: ADVANCED PRACTICE MIDWIFE
Payer: COMMERCIAL

## 2021-06-04 VITALS — DIASTOLIC BLOOD PRESSURE: 69 MMHG | SYSTOLIC BLOOD PRESSURE: 105 MMHG

## 2021-06-04 DIAGNOSIS — Z3A.21: ICD-10-CM

## 2021-06-04 DIAGNOSIS — O21.0: Primary | ICD-10-CM

## 2021-06-04 LAB
ALBUMIN DIAFP-MCNC: 3.3 G/DL (ref 3.2–5.5)
ALBUMIN/GLOB SERPL: 1.1 {RATIO} (ref 1–2.2)
ALP SERPL-CCNC: 43 IU/L (ref 42–121)
ALT SERPL W P-5'-P-CCNC: 16 IU/L (ref 10–60)
ANION GAP SERPL CALCULATED.4IONS-SCNC: 11 MMOL/L (ref 6–13)
AST SERPL W P-5'-P-CCNC: 15 IU/L (ref 10–42)
BASOPHILS NFR BLD AUTO: 0 10^3/UL (ref 0–0.1)
BASOPHILS NFR BLD AUTO: 0.2 %
BILIRUB BLD-MCNC: 0.5 MG/DL (ref 0.2–1)
BUN SERPL-MCNC: 7 MG/DL (ref 6–20)
CALCIUM UR-MCNC: 8.6 MG/DL (ref 8.5–10.3)
CHLORIDE SERPL-SCNC: 104 MMOL/L (ref 101–111)
CO2 SERPL-SCNC: 22 MMOL/L (ref 21–32)
CREAT SERPLBLD-SCNC: 0.6 MG/DL (ref 0.4–1)
EOSINOPHIL # BLD AUTO: 0 10^3/UL (ref 0–0.7)
EOSINOPHIL NFR BLD AUTO: 0.4 %
ERYTHROCYTE [DISTWIDTH] IN BLOOD BY AUTOMATED COUNT: 13.3 % (ref 12–15)
FERRITIN SERPL-MCNC: 19.5 NG/ML (ref 11–306.8)
FOLATE SERPL-MCNC: 13.63 NG/ML
GFRSERPLBLD MDRD-ARVRAT: 123 ML/MIN/{1.73_M2} (ref 89–?)
GLOBULIN SER-MCNC: 2.9 G/DL (ref 2.1–4.2)
GLUCOSE SERPL-MCNC: 78 MG/DL (ref 70–100)
HCT VFR BLD AUTO: 33 % (ref 37–47)
HGB UR QL STRIP: 11.6 G/DL (ref 12–16)
IRON SATN MFR SERPL: 20 % (ref 20–50)
IRON SERPL-MCNC: 85 UG/DL (ref 28–170)
LYMPHOCYTES # SPEC AUTO: 1.3 10^3/UL (ref 1.5–3.5)
LYMPHOCYTES NFR BLD AUTO: 15.5 %
MCH RBC QN AUTO: 32.6 PG (ref 27–31)
MCHC RBC AUTO-ENTMCNC: 35.2 G/DL (ref 32–36)
MCV RBC AUTO: 92.7 FL (ref 81–99)
MONOCYTES # BLD AUTO: 0.4 10^3/UL (ref 0–1)
MONOCYTES NFR BLD AUTO: 4.7 %
NEUTROPHILS # BLD AUTO: 6.5 10^3/UL (ref 1.5–6.6)
NEUTROPHILS # SNV AUTO: 8.2 X10^3/UL (ref 4.8–10.8)
NEUTROPHILS NFR BLD AUTO: 79 %
NRBC # BLD AUTO: 0 /100WBC
NRBC # BLD AUTO: 0 X10^3/UL
PDW BLD AUTO: 10.6 FL (ref 7.9–10.8)
PLATELET # BLD: 233 10^3/UL (ref 130–450)
POTASSIUM SERPL-SCNC: 3.8 MMOL/L (ref 3.5–5)
PROT SPEC-MCNC: 6.2 G/DL (ref 6.7–8.2)
RBC MAR: 3.56 10^6/UL (ref 4.2–5.4)
SODIUM SERPLBLD-SCNC: 137 MMOL/L (ref 135–145)
TIBC SERPL-MCNC: 417 UG/DL (ref 250–450)
TRANSFERRIN SERPL-MCNC: 298 MG/DL (ref 192–382)
VIT B12 SERPL-MCNC: 279 PG/ML (ref 180–914)

## 2021-06-04 PROCEDURE — 82306 VITAMIN D 25 HYDROXY: CPT

## 2021-06-04 PROCEDURE — 82728 ASSAY OF FERRITIN: CPT

## 2021-06-04 PROCEDURE — 99213 OFFICE O/P EST LOW 20 MIN: CPT

## 2021-06-04 PROCEDURE — 99215 OFFICE O/P EST HI 40 MIN: CPT

## 2021-06-04 PROCEDURE — 82607 VITAMIN B-12: CPT

## 2021-06-04 PROCEDURE — 96374 THER/PROPH/DIAG INJ IV PUSH: CPT

## 2021-06-04 PROCEDURE — 83540 ASSAY OF IRON: CPT

## 2021-06-04 PROCEDURE — 96376 TX/PRO/DX INJ SAME DRUG ADON: CPT

## 2021-06-04 PROCEDURE — 36415 COLL VENOUS BLD VENIPUNCTURE: CPT

## 2021-06-04 PROCEDURE — 82746 ASSAY OF FOLIC ACID SERUM: CPT

## 2021-06-04 PROCEDURE — 84466 ASSAY OF TRANSFERRIN: CPT

## 2021-06-04 PROCEDURE — 80053 COMPREHEN METABOLIC PANEL: CPT

## 2021-06-04 PROCEDURE — 85025 COMPLETE CBC W/AUTO DIFF WBC: CPT

## 2021-06-04 RX ADMIN — BENZOCAINE AND MENTHOL PRN LOZENGE: 15; 3.6 LOZENGE ORAL at 13:52

## 2021-06-04 RX ADMIN — BENZOCAINE AND MENTHOL PRN LOZENGE: 15; 3.6 LOZENGE ORAL at 15:53

## 2021-06-04 RX ADMIN — ONDANSETRON PRN MG: 2 INJECTION INTRAMUSCULAR; INTRAVENOUS at 15:50

## 2021-06-04 RX ADMIN — ONDANSETRON PRN MG: 2 INJECTION INTRAMUSCULAR; INTRAVENOUS at 12:40

## 2021-06-04 NOTE — PROVIDER PROGRESS NOTE
- HPI


Chief Complaint: GI symptoms


Current Pregnancy: 


                                                               Current Pregnancy





EDU                              10/14/21


Gestation                        21 Weeks and 1 Days


                          3


Para                             0





Vital Signs











Temperature  36.2 C L  21 11:57


 


Heart Rate  75   21 11:57


 


Respiratory Rate  18   21 11:57


 


Blood Pressure  105/69   21 11:57


 


O2 Saturation  100   21 11:57




















Temperature  36.2 C L  21 11:57


 


Heart Rate  75   21 11:57


 


Respiratory Rate  18   21 11:57


 


Blood Pressure  105/69   21 11:57


 


O2 Saturation  100   21 11:57














- Plan


Plan: 


Pt evaluated face-to-face


S: Rigoberto presents today with her  secondary to persistent vomiting in 

pregnancy and this morning has progressed to vomiting blood. She states her 

throat is very sore. This happened in the first trimester on two occasions and 

she was seen in the ED secondarily. She was hydrated and given IV anti-nausea 

medications and states it did make her feel significantly better. She has been 

hesitant to present for evaluation and management secondary to her fear of 

needles. She reports she has not been able to eat anything for the past 36 

hours. She was able to drink water yesterday. She states she does feel 

dehydrated but not has bad as she has felt in the past. She states overall she 

feels her horrible days are fewer and further between than they were in the 

past. She has now been 3 weeks without a terrible day like she is having today. 


Able to consistently drink smoothies and water. Things that have sounded good to

her in the past no longer sound good and she cannot think about eating without 

vomiting.





O: FHR via doppler WNL


VS WNL





1 liter of LR infused over 2 hours.


4mg IV zofran administered - pt feels significantly improved.





CBC, CMP, nutrition lab panel ordered - all WNL.





A: 24yo  @ 21.1wks gestation


Hyperemesis gravidarum- pt improved significantly following fluid and zofran 

administration.





P:


Reviewed precautions and when to present. 


Discussed PICC line placement if persistently presenting for IVs however pt does

feel the frequency of her "bad days" is improving.


Reviewed BRAT diet and encouraged regular use of zofran. 


Reassured by normal nutrition labs and electrolytes.


Pt verbalized understanding and agrees to above plan. She denies further 

questions or concerns at this time.





FINAL ASSESSMENT:


Hyperemesis gravidarum

## 2021-07-01 ENCOUNTER — HOSPITAL ENCOUNTER (OUTPATIENT)
Dept: HOSPITAL 76 - FBP | Age: 26
Discharge: HOME | End: 2021-07-01
Attending: ADVANCED PRACTICE MIDWIFE
Payer: COMMERCIAL

## 2021-07-01 VITALS — DIASTOLIC BLOOD PRESSURE: 64 MMHG | SYSTOLIC BLOOD PRESSURE: 119 MMHG

## 2021-07-01 DIAGNOSIS — Z3A.25: ICD-10-CM

## 2021-07-01 DIAGNOSIS — O21.2: ICD-10-CM

## 2021-07-01 DIAGNOSIS — O36.8120: Primary | ICD-10-CM

## 2021-07-01 LAB
ALBUMIN DIAFP-MCNC: 3.5 G/DL (ref 3.2–5.5)
ALBUMIN/GLOB SERPL: 1 {RATIO} (ref 1–2.2)
ALP SERPL-CCNC: 62 IU/L (ref 42–121)
ALT SERPL W P-5'-P-CCNC: 29 IU/L (ref 10–60)
ANION GAP SERPL CALCULATED.4IONS-SCNC: 11 MMOL/L (ref 6–13)
AST SERPL W P-5'-P-CCNC: 32 IU/L (ref 10–42)
BASOPHILS NFR BLD AUTO: 0 10^3/UL (ref 0–0.1)
BASOPHILS NFR BLD AUTO: 0.3 %
BILIRUB BLD-MCNC: 0.8 MG/DL (ref 0.2–1)
BUN SERPL-MCNC: 12 MG/DL (ref 6–20)
CALCIUM UR-MCNC: 8.6 MG/DL (ref 8.5–10.3)
CHLORIDE SERPL-SCNC: 102 MMOL/L (ref 101–111)
CLARITY UR REFRACT.AUTO: CLEAR
CO2 SERPL-SCNC: 23 MMOL/L (ref 21–32)
CREAT SERPLBLD-SCNC: 0.6 MG/DL (ref 0.4–1)
EOSINOPHIL # BLD AUTO: 0.1 10^3/UL (ref 0–0.7)
EOSINOPHIL NFR BLD AUTO: 0.4 %
ERYTHROCYTE [DISTWIDTH] IN BLOOD BY AUTOMATED COUNT: 12.5 % (ref 12–15)
GFRSERPLBLD MDRD-ARVRAT: 122 ML/MIN/{1.73_M2} (ref 89–?)
GLOBULIN SER-MCNC: 3.5 G/DL (ref 2.1–4.2)
GLUCOSE SERPL-MCNC: 77 MG/DL (ref 70–100)
GLUCOSE UR QL STRIP.AUTO: NEGATIVE MG/DL
HCT VFR BLD AUTO: 37.3 % (ref 37–47)
HGB UR QL STRIP: 12.8 G/DL (ref 12–16)
KETONES UR QL STRIP.AUTO: NEGATIVE MG/DL
LYMPHOCYTES # SPEC AUTO: 2.4 10^3/UL (ref 1.5–3.5)
LYMPHOCYTES NFR BLD AUTO: 20.8 %
MCH RBC QN AUTO: 31.9 PG (ref 27–31)
MCHC RBC AUTO-ENTMCNC: 34.3 G/DL (ref 32–36)
MCV RBC AUTO: 93 FL (ref 81–99)
MONOCYTES # BLD AUTO: 0.8 10^3/UL (ref 0–1)
MONOCYTES NFR BLD AUTO: 6.7 %
NEUTROPHILS # BLD AUTO: 8.2 10^3/UL (ref 1.5–6.6)
NEUTROPHILS # SNV AUTO: 11.5 X10^3/UL (ref 4.8–10.8)
NEUTROPHILS NFR BLD AUTO: 71.4 %
NITRITE UR QL STRIP.AUTO: NEGATIVE
NRBC # BLD AUTO: 0 /100WBC
NRBC # BLD AUTO: 0 X10^3/UL
PDW BLD AUTO: 10.4 FL (ref 7.9–10.8)
PH UR STRIP.AUTO: 7.5 PH (ref 5–7.5)
PLATELET # BLD: 304 10^3/UL (ref 130–450)
POTASSIUM SERPL-SCNC: 4.1 MMOL/L (ref 3.5–5)
PROT SPEC-MCNC: 7 G/DL (ref 6.7–8.2)
PROT UR STRIP.AUTO-MCNC: NEGATIVE MG/DL
RBC # UR STRIP.AUTO: NEGATIVE /UL
RBC MAR: 4.01 10^6/UL (ref 4.2–5.4)
SODIUM SERPLBLD-SCNC: 136 MMOL/L (ref 135–145)
SP GR UR STRIP.AUTO: 1.02 (ref 1–1.03)
UROBILINOGEN UR QL STRIP.AUTO: (no result) E.U./DL
UROBILINOGEN UR STRIP.AUTO-MCNC: NEGATIVE MG/DL

## 2021-07-01 PROCEDURE — 99214 OFFICE O/P EST MOD 30 MIN: CPT

## 2021-07-01 PROCEDURE — 85025 COMPLETE CBC W/AUTO DIFF WBC: CPT

## 2021-07-01 PROCEDURE — 80053 COMPREHEN METABOLIC PANEL: CPT

## 2021-07-01 PROCEDURE — 96361 HYDRATE IV INFUSION ADD-ON: CPT

## 2021-07-01 PROCEDURE — 36415 COLL VENOUS BLD VENIPUNCTURE: CPT

## 2021-07-01 PROCEDURE — 81001 URINALYSIS AUTO W/SCOPE: CPT

## 2021-07-01 PROCEDURE — 81003 URINALYSIS AUTO W/O SCOPE: CPT

## 2021-07-01 PROCEDURE — 99215 OFFICE O/P EST HI 40 MIN: CPT

## 2021-07-01 PROCEDURE — 87086 URINE CULTURE/COLONY COUNT: CPT

## 2021-07-01 PROCEDURE — 96374 THER/PROPH/DIAG INJ IV PUSH: CPT

## 2021-07-02 NOTE — PROVIDER PROGRESS NOTE
- HPI


Chief Complaint: Decreased fetal movement


Current Pregnancy: 


                                                               Current Pregnancy





EDU                              10/14/21


Gestation                        25 Weeks and 0 Days


                          3


Para                             0





Vital Signs











Temperature  36.9 C   21 17:50


 


Heart Rate  83   21 17:50


 


Respiratory Rate  17   21 17:50


 


Blood Pressure  107/64   21 17:50


 


O2 Saturation  98   21 17:50




















Temperature  37.2 C   21 20:35


 


Heart Rate  76   21 20:35


 


Respiratory Rate  18   21 20:35


 


Blood Pressure  119/64   21 20:35


 


O2 Saturation  99   21 20:35














- Plan


Plan: 





Riogberto presented to L&D triage in a distressed state. Reporting overheating, 

lethargy, nausea with vomiting being unable to keep any food in her system for 

over 24 hours.


She denies ctx, LOF or VB, however she reports a decrease in fetal movement 

today.





O:


Pt verbal and oriented x3


Skin color and turgor wnl


Warm to touch


Abdomen: gravid, soft, nontender


No emesis noted during time at patient bedside





Fetal monitoring difficult r/t maternal movement and fetal age


Baseline wnl, variability noted





IV started and 1L LR given


CMP- wnl


CBC- wnl


UA- wnl





IV antiemetics ordered and effective





A:


27yo  at 25.0 wks gestation who presents with persistent nausea/vomiting and

decreased fetal movement


IV rehydration indicated


LABS reassuring





P:


Discharge to home with regular prenatal care


Adjusted antiemetic medications sent to preferred pharmacy. 





30 mins spent face to face with this patient

## 2021-08-09 ENCOUNTER — HOSPITAL ENCOUNTER (OUTPATIENT)
Dept: HOSPITAL 76 - LAB | Age: 26
Discharge: HOME | End: 2021-08-09
Attending: ADVANCED PRACTICE MIDWIFE
Payer: COMMERCIAL

## 2021-08-09 DIAGNOSIS — Z36.89: ICD-10-CM

## 2021-08-09 DIAGNOSIS — O09.70: Primary | ICD-10-CM

## 2021-08-09 LAB
ERYTHROCYTE [DISTWIDTH] IN BLOOD BY AUTOMATED COUNT: 12.1 % (ref 12–15)
EST. AVERAGE GLUCOSE BLD GHB EST-MCNC: 94 MG/DL (ref 70–100)
HBA1C MFR BLD HPLC: 4.9 % (ref 4.27–6.07)
HCT VFR BLD AUTO: 36.1 % (ref 37–47)
HGB UR QL STRIP: 12.3 G/DL (ref 12–16)
MCH RBC QN AUTO: 31.6 PG (ref 27–31)
MCHC RBC AUTO-ENTMCNC: 34.1 G/DL (ref 32–36)
MCV RBC AUTO: 92.8 FL (ref 81–99)
NEUTROPHILS # SNV AUTO: 8.8 X10^3/UL (ref 4.8–10.8)
PDW BLD AUTO: 10.5 FL (ref 7.9–10.8)
PLATELET # BLD: 269 10^3/UL (ref 130–450)
RBC MAR: 3.89 10^6/UL (ref 4.2–5.4)

## 2021-08-09 PROCEDURE — 83036 HEMOGLOBIN GLYCOSYLATED A1C: CPT

## 2021-08-09 PROCEDURE — 86787 VARICELLA-ZOSTER ANTIBODY: CPT

## 2021-08-09 PROCEDURE — 82950 GLUCOSE TEST: CPT

## 2021-08-09 PROCEDURE — 85027 COMPLETE CBC AUTOMATED: CPT

## 2021-08-09 PROCEDURE — 86850 RBC ANTIBODY SCREEN: CPT

## 2021-08-09 PROCEDURE — 36415 COLL VENOUS BLD VENIPUNCTURE: CPT

## 2021-08-10 ENCOUNTER — HOSPITAL ENCOUNTER (OUTPATIENT)
Dept: HOSPITAL 76 - WFO | Age: 26
Discharge: HOME | End: 2021-08-10
Attending: OBSTETRICS & GYNECOLOGY
Payer: COMMERCIAL

## 2021-08-10 VITALS — SYSTOLIC BLOOD PRESSURE: 101 MMHG | DIASTOLIC BLOOD PRESSURE: 50 MMHG

## 2021-08-10 DIAGNOSIS — Z3A.30: ICD-10-CM

## 2021-08-10 DIAGNOSIS — O99.613: ICD-10-CM

## 2021-08-10 DIAGNOSIS — K21.9: ICD-10-CM

## 2021-08-10 DIAGNOSIS — O21.0: Primary | ICD-10-CM

## 2021-08-10 PROCEDURE — 99213 OFFICE O/P EST LOW 20 MIN: CPT

## 2021-08-10 PROCEDURE — 99214 OFFICE O/P EST MOD 30 MIN: CPT

## 2021-08-10 PROCEDURE — 59025 FETAL NON-STRESS TEST: CPT

## 2021-08-10 PROCEDURE — 96374 THER/PROPH/DIAG INJ IV PUSH: CPT

## 2021-08-10 NOTE — PROVIDER PROGRESS NOTE
- HPI


Chief Complaint: Hyperemesis


Current Pregnancy: 


                                                               Current Pregnancy





EDU                              10/14/21


Gestation                        30 Weeks and 5 Days


                          2


Para                             0





Vital Signs











Temperature  99.0 F   08/10/21 20:30


 


Heart Rate  76   08/10/21 20:30


 


Respiratory Rate  16   08/10/21 20:30


 


Blood Pressure  118/73   08/10/21 20:30


 


O2 Saturation  100   08/10/21 20:30




















Temperature  99.0 F   08/10/21 20:30


 


Heart Rate  76   08/10/21 20:30


 


Respiratory Rate  16   08/10/21 20:30


 


Blood Pressure  118/73   08/10/21 20:30


 


O2 Saturation  100   08/10/21 20:30














- Procedures


NST Procedure: 


Start: 


 Stop: 21:58





 mod mark 10x10 accels no decels


TOCO: quiet





-AGA/Cat I


Service Date of procedure: 08/10/21





- Plan


Plan: 





ID: Patient is a 27 yo  at 30+5 wga here with nausea. 








HPI: 


Patient has had ongoing issues with nausea and vomiting. Underwent glucola on 

21 and had emesis. Notes that she continued to have nausea fter vomiting her

glucola. Had one bout of emesis today accompanied with small amount of blood. 

Felt she would not be able to adequately hydrate herself with the bleeding. 

Bleeding has not been ongoing and she has not had any further nausea since 

arrival. received Rhogam yesterday. Take dramamine daily for nausea. Has GERD, 

no meds to date. 








PNC: 





LMP: 2021


ROHINI by LMP:10/14/2021


Initial U/S:2021 @ 10.0wks c/w LMP dating. (ROHINI by U/S 10/18/2021)


FINAL ROHINI: 10/14/2021


A NEG


***Rhogam: 21


Rubella non immune/VZV:pending


Genetic testing: Quad Negative


FAS: done 21 WNL. Posterior placenta, no previa. 3VC. Size c/w dating EFW 

27%tile.


Glucola ordered , antibody screen, and VZV


21; emesis with glucola; not complete


Influenza: declined


TDAP: declined


GBS @ 36 wks


HSV: denies self and partner


Breast pump Rx: printed 21


MOD: .  Lance. Gender- surprise!


pp contraception:


pap: 2017 NORMAL- feel shas had one more recently, will bring. 





PMH: none





PSH: none





FH: BPD- mother; CVA, asthma- father





SOCIAL HX: 


   No KIRK


    Race: White


   Marital status: 


   Occupation: outside work


   Type of work: [employer]


   /Father of baby: Lance Baca


   FOB occupation [employer] [place]





ROS: As per HPI, otherwise remaining systems are negative.





PE: 


VS 89 118/73 100%


GEN: NAD


HEENT: NCAT


CV: RR


RESP: nl effort


ABD; gravid, S&NT


EXT: no TTP


PSYCH: appropriate affect


NEURO: A&O





A/P: 27 yo  at 30+5 wga here with N/V





-Received 1L IVF and IV antiemetics. Reports symptoms have resolved


-Provided with Rx for famotidine





-AGA/Cat I tracing for early gestational age





Warning signs reviewed. 


FU in OB clinic

## 2021-09-16 ENCOUNTER — HOSPITAL ENCOUNTER (OUTPATIENT)
Dept: HOSPITAL 76 - WFO | Age: 26
Discharge: HOME | End: 2021-09-16
Attending: ADVANCED PRACTICE MIDWIFE
Payer: COMMERCIAL

## 2021-09-16 VITALS — DIASTOLIC BLOOD PRESSURE: 56 MMHG | SYSTOLIC BLOOD PRESSURE: 105 MMHG

## 2021-09-16 DIAGNOSIS — Z3A.36: ICD-10-CM

## 2021-09-16 DIAGNOSIS — O21.2: Primary | ICD-10-CM

## 2021-09-16 PROCEDURE — 96374 THER/PROPH/DIAG INJ IV PUSH: CPT

## 2021-09-16 PROCEDURE — 59025 FETAL NON-STRESS TEST: CPT

## 2021-09-16 PROCEDURE — 99213 OFFICE O/P EST LOW 20 MIN: CPT

## 2021-09-17 NOTE — PROVIDER PROGRESS NOTE
- HPI


Chief Complaint: Hyperemesis


Current Pregnancy: 


                                                               Current Pregnancy





EDU                              10/14/21


Gestation                        36 Weeks and 0 Days


                          3


Para                             0





Vital Signs











Temperature  37.1 C   21 19:46


 


Heart Rate  79   21 19:46


 


Respiratory Rate  20   21 19:46


 


Blood Pressure  105/56 L  21 19:46


 


O2 Saturation  100   21 19:46




















Temperature  37.1 C   21 20:14


 


Heart Rate  79   21 20:14


 


Respiratory Rate  20   21 20:14


 


Blood Pressure  105/56 L  21 20:14


 


O2 Saturation  100   21 19:46














- Procedures


OB Procedure Performed: NST


NST Procedure: 


NST Procedure





Start Date                       21


Start Time                       18:45


Stop Time                        20:25


Vibroacoustic Stimulation Used   No


Patient States Fetal Movement    Yes











- Plan


Plan: 





Rigoberto presents to [facility] with c/o nausea and vomiting. She states she has not 

been able to keep anything down consistently for the past 24 hours and she is 

feeling dehydrated. She states all of the oral medications she has tried have 

not given her any relief so she has not taken anything today to help. She states

over the past several hours she has noticed streaks of blood in her vomit. She 

denies vaginal bleeding, leakage of fluid or contractions. She reports +FM.





NST performed 2021


NST read 2021


NST reactive. FHR baseline 150s, moderate variability, + accels, no decels


No contractions appreciated via tocometry





/56, HR 79, T 98.7





Pt given 1 liter of IV LR for hydrations and 4mg IVP zofran x 1. She was then 

able to eat a whole turkey sandwich and feels significantly improved.


She was released home with precautions.





FINAL DIAGNOSIS:


Hyperemesis

## 2021-09-21 ENCOUNTER — HOSPITAL ENCOUNTER (OUTPATIENT)
Dept: HOSPITAL 76 - LAB.WC | Age: 26
Discharge: HOME | End: 2021-09-21
Attending: ADVANCED PRACTICE MIDWIFE
Payer: COMMERCIAL

## 2021-09-21 ENCOUNTER — HOSPITAL ENCOUNTER (OUTPATIENT)
Dept: HOSPITAL 76 - WFO | Age: 26
Discharge: HOME | End: 2021-09-21
Attending: ADVANCED PRACTICE MIDWIFE
Payer: COMMERCIAL

## 2021-09-21 VITALS — SYSTOLIC BLOOD PRESSURE: 102 MMHG | DIASTOLIC BLOOD PRESSURE: 56 MMHG

## 2021-09-21 DIAGNOSIS — Z3A.24: ICD-10-CM

## 2021-09-21 DIAGNOSIS — Z36.85: Primary | ICD-10-CM

## 2021-09-21 DIAGNOSIS — Z36.85: ICD-10-CM

## 2021-09-21 DIAGNOSIS — O21.0: Primary | ICD-10-CM

## 2021-09-21 LAB
ALBUMIN DIAFP-MCNC: 3 G/DL (ref 3.2–5.5)
ALBUMIN/GLOB SERPL: 0.8 {RATIO} (ref 1–2.2)
ALP SERPL-CCNC: 111 IU/L (ref 42–121)
ALT SERPL W P-5'-P-CCNC: 16 IU/L (ref 10–60)
ANION GAP SERPL CALCULATED.4IONS-SCNC: 8 MMOL/L (ref 6–13)
AST SERPL W P-5'-P-CCNC: 17 IU/L (ref 10–42)
BILIRUB BLD-MCNC: 0.4 MG/DL (ref 0.2–1)
BUN SERPL-MCNC: 8 MG/DL (ref 6–20)
CALCIUM UR-MCNC: 8.6 MG/DL (ref 8.5–10.3)
CHLORIDE SERPL-SCNC: 108 MMOL/L (ref 101–111)
CO2 SERPL-SCNC: 23 MMOL/L (ref 21–32)
CREAT SERPLBLD-SCNC: 0.5 MG/DL (ref 0.4–1)
GFRSERPLBLD MDRD-ARVRAT: 150 ML/MIN/{1.73_M2} (ref 89–?)
GLOBULIN SER-MCNC: 3.6 G/DL (ref 2.1–4.2)
GLUCOSE SERPL-MCNC: 79 MG/DL (ref 70–100)
POTASSIUM SERPL-SCNC: 4.1 MMOL/L (ref 3.5–5)
PROT SPEC-MCNC: 6.6 G/DL (ref 6.7–8.2)
SODIUM SERPLBLD-SCNC: 139 MMOL/L (ref 135–145)

## 2021-09-21 PROCEDURE — 99211 OFF/OP EST MAY X REQ PHY/QHP: CPT

## 2021-09-21 PROCEDURE — 87797 DETECT AGENT NOS DNA DIR: CPT

## 2021-09-21 PROCEDURE — 96374 THER/PROPH/DIAG INJ IV PUSH: CPT

## 2021-09-21 PROCEDURE — 87081 CULTURE SCREEN ONLY: CPT

## 2021-09-21 PROCEDURE — 80053 COMPREHEN METABOLIC PANEL: CPT

## 2021-09-21 PROCEDURE — 87181 SC STD AGAR DILUTION PER AGT: CPT

## 2021-09-21 PROCEDURE — 36415 COLL VENOUS BLD VENIPUNCTURE: CPT

## 2021-09-21 PROCEDURE — 96361 HYDRATE IV INFUSION ADD-ON: CPT

## 2021-09-22 NOTE — PROVIDER PROGRESS NOTE
- HPI


Chief Complaint: Hyperemesis


Current Pregnancy: 


                                                               Current Pregnancy





EDU                              10/14/21


Gestation                        36 Weeks and 5 Days


                          3


Para                             0





Vital Signs











Temperature  36.5 C   21 12:21


 


Heart Rate  65   21 12:21


 


Respiratory Rate  16   21 12:21


 


Blood Pressure  102/56 L  21 12:21


 


O2 Saturation  99   21 12:21




















Temperature  36.5 C   21 12:21


 


Heart Rate  65   21 12:21


 


Respiratory Rate  16   21 12:21


 


Blood Pressure  102/56 L  21 12:21


 


O2 Saturation  99   21 12:21














- Procedures


OB Procedure Performed: NST


NST Procedure: 


NST Procedure





Start Time                       18:45


Stop Time                        13:16


Patient States Fetal Movement    Yes











- Plan


Plan: 


Pt evaluated face to face





Rigoberto presents to [facility] following her routine office visit secondary to 

persistent nausea and vomiting since 0200 this morning. She states she feels 

dehydrated. She denies VB, Lof, or contractions. Reports +FM.





NST performed 2021


NST read 2021


NST reactive. FHR baseline 140s, moderate variability, + accels, no decels


No contractions appreciated via tocometry





1 liter of LR administered over 1 hour.


4mg IVP zofran administered





Pt reports significant improvement in symptoms. She is able to eat a turkey 

sandwich. 


Rx sent for PO zofran to preferred pharmacy. 





Pt released home with precautions.


She denies further questions or concerns at this time.





FINAL DIAGNOSIS:


Hyperemesis gravidarum

## 2021-10-06 ENCOUNTER — HOSPITAL ENCOUNTER (OUTPATIENT)
Dept: HOSPITAL 76 - WFO | Age: 26
Discharge: HOME | End: 2021-10-06
Attending: STUDENT IN AN ORGANIZED HEALTH CARE EDUCATION/TRAINING PROGRAM
Payer: COMMERCIAL

## 2021-10-06 VITALS — SYSTOLIC BLOOD PRESSURE: 106 MMHG | DIASTOLIC BLOOD PRESSURE: 58 MMHG

## 2021-10-06 DIAGNOSIS — O21.2: Primary | ICD-10-CM

## 2021-10-06 DIAGNOSIS — Z3A.38: ICD-10-CM

## 2021-10-06 PROCEDURE — 99214 OFFICE O/P EST MOD 30 MIN: CPT

## 2021-10-06 PROCEDURE — 96374 THER/PROPH/DIAG INJ IV PUSH: CPT

## 2021-10-06 PROCEDURE — 59025 FETAL NON-STRESS TEST: CPT

## 2021-10-06 PROCEDURE — 96361 HYDRATE IV INFUSION ADD-ON: CPT

## 2021-10-16 ENCOUNTER — HOSPITAL ENCOUNTER (OUTPATIENT)
Dept: HOSPITAL 76 - WFO | Age: 26
Discharge: HOME | End: 2021-10-16
Attending: OBSTETRICS & GYNECOLOGY
Payer: COMMERCIAL

## 2021-10-16 VITALS — SYSTOLIC BLOOD PRESSURE: 125 MMHG | DIASTOLIC BLOOD PRESSURE: 73 MMHG

## 2021-10-16 DIAGNOSIS — O99.891: ICD-10-CM

## 2021-10-16 DIAGNOSIS — R51.9: ICD-10-CM

## 2021-10-16 DIAGNOSIS — Z3A.40: ICD-10-CM

## 2021-10-16 DIAGNOSIS — R10.2: ICD-10-CM

## 2021-10-16 DIAGNOSIS — R07.0: ICD-10-CM

## 2021-10-16 DIAGNOSIS — O21.2: Primary | ICD-10-CM

## 2021-10-16 PROCEDURE — 59025 FETAL NON-STRESS TEST: CPT

## 2021-10-16 PROCEDURE — 96374 THER/PROPH/DIAG INJ IV PUSH: CPT

## 2021-10-16 PROCEDURE — 99214 OFFICE O/P EST MOD 30 MIN: CPT

## 2021-10-16 PROCEDURE — 96361 HYDRATE IV INFUSION ADD-ON: CPT

## 2021-10-16 RX ADMIN — BENZOCAINE AND MENTHOL PRN LOZENGE: 15; 3.6 LOZENGE ORAL at 03:01

## 2021-10-16 RX ADMIN — BENZOCAINE AND MENTHOL PRN LOZENGE: 15; 3.6 LOZENGE ORAL at 04:13

## 2021-10-16 NOTE — PROVIDER PROGRESS NOTE
- HPI


Chief Complaint: Hyperemesis


Current Pregnancy: 


                                                               Current Pregnancy





EDU                              10/14/21


Gestation                        40 Weeks and 2 Days


                          3


Para                             0





Vital Signs











Temperature  37.2 C   10/16/21 00:52


 


Heart Rate  79   10/16/21 00:52


 


Respiratory Rate  16   10/16/21 00:52


 


Blood Pressure  125/73   10/16/21 00:52


 


O2 Saturation  99   10/16/21 00:52




















Temperature  37.2 C   10/16/21 02:00


 


Heart Rate  79   10/16/21 00:52


 


Respiratory Rate  16   10/16/21 00:52


 


Blood Pressure  125/73   10/16/21 00:52


 


O2 Saturation  99   10/16/21 00:52














- Procedures


OB Procedure Performed: NST


Diagnosis/Indication for NST: Other


NST Procedure: 


NST Procedure





Start Date                       10/16/21


Start Time                       00:47


Stop Time                        02:59


Vibroacoustic Stimulation Used   No


Patient States Fetal Movement    Yes











- Plan


Plan: 





Rigoberto is a 27yo  @ 40.2wks gestation by LMP c/w 10.0wks gestation who 

presents to [facility] with c/o vomiting blood. She states she has experienced this 

previously and she is certain it is related to the amount she has been vomiting 

today and yesterday. She states she is feeling rather dehydrated as well and 

feels like she needs IV fluids and anti-nausea medication which has historically

worked well for her in the past. She has been unable to eat much of anything for

the past several days and has not been able to keep much fluids down today. She 

denies vaginal bleeding, leakage of fluid or contractions. She reports +FM. She 

is experiencing moderate to mild groin discomfort that she feels is exacerbated 

by vomiting. She also reports a sore throat and a headache which she gets when 

she is dehydrated. She denies dizziness, visual disturbances or RUQ pain. 





NST performed 10/16/2021


NST read 10/16/2021


NST reactive. FHR baseline 120s, moderate variability, + accels, no decels 


Occasional contraction appreciated via tocometry however pt does not appreciate 

contraction. Palpates mild with soft resting tone.





SVE deferred


IV initiated and 1.5L LR administered over 1.5 hours


4mg IV zofran administered


1000mg PO tylenol


TUMS


Cepacol throat lozenge x 2





Assessment:


Pt improved with medication management and IV fluids. She was given a turkey 

sandwich per her request and reports feeling significantly improved.





Plan:


Pt released home with precautions.


Pt has emergency contact information and knows when to present for evaluation.


She verbalized understanding and agrees to above plan. She denies further 

questions or concerns at this time.





FINAL DIAGNOSIS:


Hyperemesis

## 2021-10-20 ENCOUNTER — HOSPITAL ENCOUNTER (OUTPATIENT)
Dept: HOSPITAL 76 - WFO | Age: 26
Discharge: HOME | End: 2021-10-20
Attending: ADVANCED PRACTICE MIDWIFE
Payer: COMMERCIAL

## 2021-10-20 VITALS — DIASTOLIC BLOOD PRESSURE: 79 MMHG | SYSTOLIC BLOOD PRESSURE: 121 MMHG

## 2021-10-20 DIAGNOSIS — O48.0: Primary | ICD-10-CM

## 2021-10-20 DIAGNOSIS — Z3A.40: ICD-10-CM

## 2021-10-20 PROCEDURE — 99213 OFFICE O/P EST LOW 20 MIN: CPT

## 2021-10-20 PROCEDURE — 59025 FETAL NON-STRESS TEST: CPT

## 2021-10-20 NOTE — PROCEDURE REPORT
- HPI


Diagnosis/Indication for NST: Post-dates gestation


                                                               Current Pregnancy





EDU                              10/14/21


Gestation                        40 Weeks and 6 Days


                          3


Para                             0





Vital Signs











Temperature  36.9 C   10/20/21 12:06


 


Heart Rate  80   10/20/21 12:06


 


Respiratory Rate  16   10/20/21 12:06


 


Blood Pressure  123/76   10/20/21 12:06




















Temperature  36.9 C   10/20/21 12:24


 


Heart Rate  80   10/20/21 12:24


 


Respiratory Rate  16   10/20/21 12:24


 


Blood Pressure  121/79   10/20/21 12:24


 


O2 Saturation      














- NST Procedure


NST Procedure





Start Date                       10/20/21


Start Time                       12:05


Stop Time                        12:32


Vibroacoustic Stimulation Used   No


Patient States Fetal Movement    Yes











- Results and Plan


Plan: 





Rigoberto presents to [facility] for scheduled NST secondary to post-dates pregnancy. She

denies concerns or complaints at this time.





NST performed 10/20/2021


NST read 10/20/2021


FHR baseline 125, moderate variability, + accels, no decels


No contractions appreciated via tocometry





Plan:


Patient released home with precautions.





FINAL DIAGNOSIS:


Post-dates pregnancy

## 2021-10-26 ENCOUNTER — HOSPITAL ENCOUNTER (INPATIENT)
Dept: HOSPITAL 76 - WFO | Age: 26
LOS: 2 days | Discharge: HOME | End: 2021-10-28
Attending: ADVANCED PRACTICE MIDWIFE | Admitting: ADVANCED PRACTICE MIDWIFE
Payer: COMMERCIAL

## 2021-10-26 DIAGNOSIS — Z3A.41: ICD-10-CM

## 2021-10-26 DIAGNOSIS — O48.0: Primary | ICD-10-CM

## 2021-10-26 DIAGNOSIS — Z88.0: ICD-10-CM

## 2021-10-26 LAB
BASOPHILS NFR BLD AUTO: 0 10^3/UL (ref 0–0.1)
BASOPHILS NFR BLD AUTO: 0.3 %
EOSINOPHIL # BLD AUTO: 0.1 10^3/UL (ref 0–0.7)
EOSINOPHIL NFR BLD AUTO: 1.2 %
ERYTHROCYTE [DISTWIDTH] IN BLOOD BY AUTOMATED COUNT: 13.5 % (ref 12–15)
HCT VFR BLD AUTO: 37.1 % (ref 37–47)
HGB UR QL STRIP: 12.6 G/DL (ref 12–16)
LYMPHOCYTES # SPEC AUTO: 2 10^3/UL (ref 1.5–3.5)
LYMPHOCYTES NFR BLD AUTO: 26.2 %
MCH RBC QN AUTO: 31.2 PG (ref 27–31)
MCHC RBC AUTO-ENTMCNC: 34 G/DL (ref 32–36)
MCV RBC AUTO: 91.8 FL (ref 81–99)
MONOCYTES # BLD AUTO: 0.5 10^3/UL (ref 0–1)
MONOCYTES NFR BLD AUTO: 6.6 %
NEUTROPHILS # BLD AUTO: 5.1 10^3/UL (ref 1.5–6.6)
NEUTROPHILS # SNV AUTO: 7.7 X10^3/UL (ref 4.8–10.8)
NEUTROPHILS NFR BLD AUTO: 65.4 %
NRBC # BLD AUTO: 0 /100WBC
NRBC # BLD AUTO: 0 X10^3/UL
PDW BLD AUTO: 11.8 FL (ref 7.9–10.8)
PLATELET # BLD: 243 10^3/UL (ref 130–450)
RBC MAR: 4.04 10^6/UL (ref 4.2–5.4)

## 2021-10-26 PROCEDURE — 96366 THER/PROPH/DIAG IV INF ADDON: CPT

## 2021-10-26 PROCEDURE — 10907ZC DRAINAGE OF AMNIOTIC FLUID, THERAPEUTIC FROM PRODUCTS OF CONCEPTION, VIA NATURAL OR ARTIFICIAL OPENING: ICD-10-PCS | Performed by: ADVANCED PRACTICE MIDWIFE

## 2021-10-26 PROCEDURE — 96365 THER/PROPH/DIAG IV INF INIT: CPT

## 2021-10-26 PROCEDURE — 86901 BLOOD TYPING SEROLOGIC RH(D): CPT

## 2021-10-26 PROCEDURE — 36415 COLL VENOUS BLD VENIPUNCTURE: CPT

## 2021-10-26 PROCEDURE — 96376 TX/PRO/DX INJ SAME DRUG ADON: CPT

## 2021-10-26 PROCEDURE — 96375 TX/PRO/DX INJ NEW DRUG ADDON: CPT

## 2021-10-26 PROCEDURE — 86850 RBC ANTIBODY SCREEN: CPT

## 2021-10-26 PROCEDURE — 86900 BLOOD TYPING SEROLOGIC ABO: CPT

## 2021-10-26 PROCEDURE — 85025 COMPLETE CBC W/AUTO DIFF WBC: CPT

## 2021-10-26 PROCEDURE — 3E0DXGC INTRODUCTION OF OTHER THERAPEUTIC SUBSTANCE INTO MOUTH AND PHARYNX, EXTERNAL APPROACH: ICD-10-PCS | Performed by: ADVANCED PRACTICE MIDWIFE

## 2021-10-26 RX ADMIN — SODIUM CHLORIDE, POTASSIUM CHLORIDE, SODIUM LACTATE AND CALCIUM CHLORIDE SCH MLS/HR: 600; 310; 30; 20 INJECTION, SOLUTION INTRAVENOUS at 18:15

## 2021-10-26 RX ADMIN — ONDANSETRON PRN MG: 2 INJECTION INTRAMUSCULAR; INTRAVENOUS at 16:01

## 2021-10-26 RX ADMIN — SODIUM CHLORIDE, PRESERVATIVE FREE SCH ML: 5 INJECTION INTRAVENOUS at 18:14

## 2021-10-26 NOTE — ANESTHESIA
Pre-Anesthesia VS, & Labs





- Diagnosis





Induction of labor





- Procedure





Vaginal delivery


Height: 5 ft 3 in





- NPO


Last Fluid Intake: clear liquids





- Pregnancy


Is Patient Pregnant?: Yes





Home Medications and Allergies





Active Medications





Carboprost Tromethamine (Carboprost Tromethamine 250 Mcg/Ml Amp)  250 mcg IM 

Q15M PRN


   PRN Reason: Step 4: Hemorrhage protocol


   Stop: 10/31/21 08:25


Oxytocin/Sodium Chloride (Pitocin/Sodium Chloride)  500 mls @ 999 mls/hr IV PRN 

PRN; Protocol


   PRN Reason: POST-PARTUM HEMORR PREVENTION


   Stop: 10/31/21 08:25


Tranexamic Acid (Tranexamic 1,000 Mg/100ml-Nacl)  1,000 mg in 100 mls @ 600 

mls/hr IV .ONCE PRN


   PRN Reason: EBL >1200mL and within 3hr


   Stop: 10/31/21 08:25


Lidocaine HCl (Lidocaine-Mpf 1% 30 Ml Vial)  30 ml ID .ONCE PRN


   PRN Reason: PERINEAL REPAIR


   Stop: 10/31/21 08:25


Methylergonovine Maleate (Methylergonovine 0.2 Mg/Ml Vial)  0.2 mg IM .ONCE PRN


   PRN Reason: Step 2: Hemorrhage protocol


   Stop: 10/31/21 08:25


Misoprostol (Misoprostol 200 Mcg Tablet)  800 mcg BC .ONCE PRN


   PRN Reason: Step 3: Hemorrhage protocol


   Stop: 10/31/21 08:25


Misoprostol (Misoprostol 100 Mcg Tablet)  50 mcg BC Q4HR ROBYN


Ondansetron HCl (Ondansetron 4 Mg/2 Ml Vial)  4 mg IVP Q4HR PRN


   PRN Reason: Nausea / Vomiting


Oxytocin (Oxytocin 10 Unit/Ml Vial)  10 unit IM .ONCE PRN


   PRN Reason: Step one: If no IV access


   Stop: 10/31/21 08:25


Sodium Chloride (Sodium Chloride Flush 0.9% 10 Ml Syringe)  10 ml IVP 

0100,0900,1700 ROBYN


Sodium Chloride (Sodium Chloride Flush 0.9% 10 Ml Syringe)  10 ml IVP PRN PRN


   PRN Reason: AS NEEDED PER PROVIDER ORDERS








Allergies/Adverse Reactions: 


                                    Allergies











Allergy/AdvReac Type Severity Reaction Status Date / Time


 


Penicillins Allergy  Anaphylaxis Verified 21 21:12














Anes History & Medical History





- Anesthetic History


Family history of Anesthesia Complications: Denies


Family history of Malignant Hyperthermia: Denies





- Medical History


Cardiovascular: reports: None


Pulmonary: reports: None


Gastrointestinal: reports: None


Urinary: reports: None


Neuro: reports: None


Musculoskeletal: reports: None


Endocrine/Autoimmune: reports: None


Blood Disorders: reports: None


Skin: reports: None


Smoking Status: Never smoker


Psychosocial: reports: No issues indicated


History of Cancer?: No





- Obstetrical History


: 1


Parity: 0


Prenatal Events: reports: Other (Hyperemesis gravidarum)





Exam


General: Alert, Oriented x3, Cooperative, No acute distress


Dental: WNL


Mouth Opening: 3 Fingerbreadth


Neck Mobility: Normal


Mallampati classification: II


Thyromental Distance: 4-6 cm


Mental/Cognitive Status: Alert/Oriented X3, Normal for patient





Plan


Anesthesia Type: Epidural


Consent for Procedure(s) Verified and Reviewed: Yes


Code Status: Attempt Resuscitation


ASA classification: 2-Mild systemic disease


Is this case an emergency?: No

## 2021-10-26 NOTE — HISTORY & PHYSICAL EXAMINATION
Prenatal Admit History





- Visit Reason


Visit Reason: Other





- Pregnancy


: 3


Parity: 0


Premature: 0


Ectopic: 0


: 2


Prenatal Care: positive: [facility]


Prenatal Risk/History: positive: None


Pregnancy Complications This Pregnancy: positive: Other


Smoking Status: Never smoker





- Mother's Labs


Mother's Blood Type: positive: A


Mother's RH: positive: Negative


GBS: positive: Group B Strep Positive


Rubella Status: positive: Non-immune





Meds/Allgy





- Home Medications


Home Medications: 


                                Ambulatory Orders











 Medication  Instructions  Recorded  Confirmed


 


Metoclopramide [Reglan] 10 mg PO Q6H PRN #14 tablet 21 


 


Ondansetron Odt [Zofran] 4 mg TL Q6H PRN #10 tablet 21 


 


Famotidine [Acid-Pep] 20 mg PO DAILY #60 tablet 08/10/21 














- Allergies


Allergies/Adverse Reactions: 


                                    Allergies











Allergy/AdvReac Type Severity Reaction Status Date / Time


 


Penicillins Allergy  Anaphylaxis Verified 21 21:12














Review of Systems





- Constitutional


Constitutional: denies: Fatigue, Fever, Chills





- Eyes


Eyes: denies: Blurred vision, Spots in vision, Dipolpia





- Cardiovascular


Cariovascular: denies: Irregular heart rate, Chest pain, Edema





- Respiratory


Respiratory: denies: Cough, SOB at rest





- Gastrointestinal


Gastrointestinal: denies: Constipation, Diarrhea, Nausea, Vomiting





- Integumentary


Integumentary: denies: Rash, Pruritis





- Neurological


Neurological: denies: Headache





Physical





- Abdominal Exam


Vital Signs: 





                                        











Temp Pulse Resp BP Pulse Ox


 


 36.7 C   84   17   131/88 H   


 


 10/26/21 08:13  10/26/21 08:13  10/26/21 08:13  10/26/21 08:13   











Contraction Frequency (min/apart): none


Contraction Intensity: positive: Mild


Uterine Resting Tone: positive: Soft





- Fetal Monitoring


Fetal Heart Rate Baseline: 140


Fetal Strip Review: positive: Category I





- Presentation


Presentation: positive: Vertex





- Vaginal Exam


Membranes: positive: Membranes intact


Dilation (in cm): 1


Station: positive: -1





- Speculum Exam


Speculum Exam Performed: positive: No





Plan for Labor





- Plan For Labor


I expect patient to be DC'd or transferred within 96 hours.: Yes


Plan for Labor: 





Rigoberto foley a 27yo  @ 41.5wks gestation by LMP c/w 10.0wk U/S who presents 

today for medical induction of labor secondary to postdates pregnancy. She 

states she is doing well now. She denies vaginal bleeding, leakage of fluid or 

contractions. She reports +FM. Expressed anxiety about the pain associated with 

labor. 


She has been a patient of [facility] through the duration of her

pregnancy which as been complicated by her consistent hyperemsis which has 

required IV hydration and antinausea medications consistently throughout her 

pregnancy. She is also noted to be GBS positive and allergic to penicillins and 

will be treated per protocol. She will be admitted for active management. She is

supported by her  Lance today.





Dating criteria:


LMP 2021


Initial U/S @ 10.0wks c/w LMP dating


Serial exams - agree





OB Hx:


G1: 10/16/2015, elective termination @ 12wks


G2: 2018, SAB @ 8wks


G3: current; complicated by hyperemesis





Medications: PNV, zofran PRN, reglan PRN


Allergies: Penicillins - critical





PMHx: unremarkable


Surgical Hx: none


Social Hx: never smoker. No ETOH or IVDA


Family Hx: Stroke/CVA - father; asthma - father; psychiatric -mother; drug 

abuse- mother





Prenatal course:


LMP: 2021


ROHINI by LMP:10/14/2021


Initial U/S:2021 @ 10.0wks c/w LMP dating. (ROHINI by U/S 10/18/2021)


FINAL ROHINI: 10/14/2021


A NEG


***Rhogam: 2021 (antibody neg)


Rubella non immune/VZV:imm


Genetic testing: Quad Negative


FAS: done 21 WNL. Posterior placenta, no previa. 3VC. Size c/w dating EFW 

27%tile.


Glucola - declined    A1c 4.9 ***Discussed 39.0wk IOL for unknown GDM. Will 

discuss with  21


Influenza: declined


TDAP: declined


COVID: declined


GBS: POSITIVE; IPAP in labor- Penicillin allergic (sensitivities performed)


HSV: denies self and partner


Breast pump Rx: printed 21


MOD: .  Lance. Gender- surprise!


pp contraception:


pap: 2017 NORMAL- feel shas had one more recently, will bring. 





Physical exam:


Normocephalic, atraumatic


Heart RRR w/o M/G/R


Lungs CTAB


Abdomen gravid, soft, nontender


EFW 3400g


FHR baseline 140s, moderate variability, + accels, no decels


Contractions no appreciated via tocometry


Bilateral LE's trace edema


Mood is irritable however this is consistent with pt's baseline


 supportive at the bedside.





Assessment:


27yo  @ 41.5wks gestation by LMP c/w 10.0wk U/S


Postdates pregnancy


GBS positive - penicillin allergic


Continuous fetal monitoring


Pre-induction cervical ripening with misoprostol 50mcg q4hrs for pre-induction 

cervical ripening.


Jacuzzi PRN. Nitrous oxide PRN.


Epidural per maternal request.


Anticipate .

## 2021-10-26 NOTE — PROVIDER PROGRESS NOTE
Labor Progress Note





- Uterine Monitoring


Uterine Monitoring Mode: positive: External toco


Contraction Frequency (min/apart): 4-6


Contraction Intensity: positive: Mild


Uterine Resting Tone: positive: Soft





- Fetal Monitoring


Fetal Monitor Mode: positive: External ultrasound


Fetal Heart Rate Baseline: 130


Fetal Heart Rate Variability: positive: Moderate (6-25 bmp)


Fetal Accelerations: positive: Present, 15x15


Fetal Decelerations: positive: None


Fetal Strip Review: positive: Category I





- Vaginal Exam


Dilation (in cm): 1-2


Effacement (%): 100


Station: 0


Cervical Position: Midposition





- Labor Progress Note


Labor Progress Note/Additional Text: 





S: Breathing through contractions. Type and screen unable to be processed in the

lab and pt declines re-draw. Hoping to get something to eat and then will likely

get an epidural after initiating pitocin. Her partner is supportive at the 

bedside.


O: FHR baseline 130s, moderate variability, + accels, no decels


Contractions palpate mild every 4-6 minutes with soft resting tone


SVE 1-2/100/0, mid position, soft


A: 25yo  @ 41.5wks gestation by LMP c/w 10.0wk U/S


Postdates pregnancy


GBS positive - penicillin allergic


FHR Category I


P: D/c misoprostol now


Initiate pitocin for IOL with titration per protocol


Initiate vancomycin for GBS prophylaxis. 


Continuous fetal monitoring


Nitrous oxide PRN. Jacuzzi PRN.


Epidural per maternal request.


Anticipate .

## 2021-10-27 PROCEDURE — 0HQ9XZZ REPAIR PERINEUM SKIN, EXTERNAL APPROACH: ICD-10-PCS | Performed by: ADVANCED PRACTICE MIDWIFE

## 2021-10-27 RX ADMIN — ONDANSETRON PRN MG: 2 INJECTION INTRAMUSCULAR; INTRAVENOUS at 10:57

## 2021-10-27 RX ADMIN — HYDROCORTISONE PRN APPLIC: 0.01 CREAM TOPICAL at 06:37

## 2021-10-27 RX ADMIN — DOCUSATE SODIUM SCH MG: 100 CAPSULE, LIQUID FILLED ORAL at 09:25

## 2021-10-27 RX ADMIN — FAMOTIDINE SCH MG: 20 TABLET, FILM COATED ORAL at 23:05

## 2021-10-27 RX ADMIN — OXYCODONE PRN MG: 5 TABLET ORAL at 14:58

## 2021-10-27 RX ADMIN — ONDANSETRON PRN MG: 2 INJECTION INTRAMUSCULAR; INTRAVENOUS at 00:43

## 2021-10-27 RX ADMIN — FAMOTIDINE SCH MG: 20 TABLET, FILM COATED ORAL at 11:21

## 2021-10-27 RX ADMIN — IBUPROFEN SCH MG: 800 TABLET, FILM COATED ORAL at 21:40

## 2021-10-27 RX ADMIN — ACETAMINOPHEN SCH MG: 500 TABLET ORAL at 03:36

## 2021-10-27 RX ADMIN — ACETAMINOPHEN SCH MG: 500 TABLET ORAL at 13:27

## 2021-10-27 RX ADMIN — OXYCODONE PRN MG: 5 TABLET ORAL at 19:00

## 2021-10-27 RX ADMIN — DOCUSATE SODIUM SCH MG: 100 CAPSULE, LIQUID FILLED ORAL at 21:41

## 2021-10-27 RX ADMIN — ACETAMINOPHEN SCH MG: 500 TABLET ORAL at 21:40

## 2021-10-27 RX ADMIN — IBUPROFEN SCH MG: 800 TABLET, FILM COATED ORAL at 03:37

## 2021-10-27 RX ADMIN — OXYCODONE PRN MG: 5 TABLET ORAL at 11:09

## 2021-10-27 RX ADMIN — SODIUM CHLORIDE, PRESERVATIVE FREE SCH ML: 5 INJECTION INTRAVENOUS at 10:57

## 2021-10-27 RX ADMIN — IBUPROFEN SCH MG: 800 TABLET, FILM COATED ORAL at 09:25

## 2021-10-27 RX ADMIN — SODIUM CHLORIDE, PRESERVATIVE FREE SCH ML: 5 INJECTION INTRAVENOUS at 21:45

## 2021-10-27 RX ADMIN — IBUPROFEN SCH MG: 800 TABLET, FILM COATED ORAL at 14:59

## 2021-10-27 RX ADMIN — OXYCODONE PRN MG: 5 TABLET ORAL at 23:05

## 2021-10-27 RX ADMIN — SODIUM CHLORIDE, POTASSIUM CHLORIDE, SODIUM LACTATE AND CALCIUM CHLORIDE SCH MLS/HR: 600; 310; 30; 20 INJECTION, SOLUTION INTRAVENOUS at 00:43

## 2021-10-27 NOTE — PROVIDER PROGRESS NOTE
Labor Progress Note





- Uterine Monitoring


Uterine Monitoring Mode: positive: External toco


Contraction Intensity: positive: Mild





- Fetal Monitoring


Fetal Monitor Mode: positive: External ultrasound


Fetal Heart Rate Baseline: 140


Fetal Heart Rate Variability: positive: Moderate (6-25 bmp)


Fetal Accelerations: positive: Present, 15x15


Fetal Decelerations: positive: Variable, Intermittent (<50% x20 min)


Fetal Strip Review: positive: Category I





- Vaginal Exam


Dilation (in cm): 5


Effacement (%): 100


Station: 0





- Labor Progress Note


Labor Progress Note/Additional Text: 





S: Feeling comfortable with epidural. She is feeling frustrated and unheard when

she voices complaints. Reports she is feeling hungry and desires to eat but 

voices understanding when explained that she cannot eat when she has an epid

ural. Offered Jello which she declined and states she feels satisfied after a 

Popsicle and ice chips. Requests new RN for care which was reviewed with the 

charge nurse who will take over patient care. Her partner Lance is supportive at

the bedside.


O: FHR baseline 140, moderate variability, no accels, intermittent variable 

deceleration


SVE 5/100/0


AROM moderate amount of clear fluid


S/p 1 dose of 2g Vancomycin per protocol


A: 27yo  @ 41.6wks gestation by LMP c/w 10.0wk U/S


postdates pregnancy


GBS positive


FHR Category II


P: Continue titration of pitocin for IOL with titration per protocol 


Continuous fetal monitoring.


Maintain epidural for pain management


Anticipate

## 2021-10-27 NOTE — DELIVERY NOTE
Delivery Note





- Labor


Labor: positive: Augmented by ARM, Induced by oxytocin





- Infant Delivery Method


Infant Delivery Method: positive: Spontaneous vaginal delivery





- Cervical Ripening Method


Cervical Ripening Method: positive: Misoprostil





- Birth Presentation


Birth Presentation: positive: Vertex, MCKAYLA - right occiput anterior





- Nuchal Cord


Nuchal Cord: positive: Present, Reduced





- Amniotic Fluid Description


Amniotic Fluid Description: positive: Clear





- Episiotomy Type


Episiotomy Type: positive: None





- Laceration


Laceration: positive: 1st degree, Labial, Perineal





- Suture


Suture Type: positive: Vicryl


Suture Size: positive: 3-0, 4-0





- Delivery Outcome


Delivery Outcome: positive: Livebirth





- 


: positive: Placed in direct skin contact with mother, Stimulated, 

Warmed, [place] used


 sex: positive: Male





- Cord


Cord: positive: 3 vessels





- Placenta


Placenta: positive: Intact, Spontaneous





- Estimated Blood Loss


Estimated Blood Loss (in cc): 150





- Post Delivery Events


Post Delivery Events: positive: No post delivery events





- Delivery Comments (Free Text/Narrative)


Delivery Comments (Free Text/Narrative): 





Labor: This 27yo  @ 41.6wks gestation by LMP c/w 10.0wk U/S presented on 

10/26/2021 for medical induction of labor secondary to postdates pregnancy. Upon

arrival SVE was 1/90/-1 and vertex. She was given 2 doses of 50mcg BC 

misoprostol for pre-induction cervical ripening. Pitocin was initiated for 

induction of labor with a maximum infusion rate of 10mU/mL. FHR pattern 

demonstrated Category II pattern throughout much of the active phase of labor 

but remained overall reassuring. Epidural placed per maternal request. AROM 

occurred at 2350 and was noted to be a moderate amount of clear fluid. Pt 

progressed to c/c/+3 @ 0119.


Birth: Normal  of viable male infant on 10/27/2021 @ 0120. Nuchal cord x 1 

was reduced. The  was stimulated, dried, and placed skin to skin. Apgar's

were 8/8 at 1 and 5 minutes respectively. Pitocin administered via IV for 

hemostasis. The umbilical cord was allowed to stop pulsating at which time it 

was doubly clamped by CNM and cut by FOB. 3VC. Cord blood was obtained. Fundal 

massage and gentle cord traction applied for active management of the third 

stage. Placenta delivered spontaneously and intact @ 0124. EBL 150mL.


Fourth stage: Uterine fundus firm and there is no excessive bleeding. The 

perineum, vagina, and cervix were inspected and noted to have a 1st degree 

perineal laceration which was repaired using a 3-0 vicryl on a CT-1 needle in 

standard fashion and under sterile conditions. In addition she was noted to have

a 1st degree right labial laceration which was repaired with a 4-0 vicryl on an 

SH needle in standard fashion under sterile conditions. Vaginal examination 

following repair was done. Tissues well approximated. Breastfeeding initiated. 

Family bonding well. Both mother and baby were left in stable condition.

## 2021-10-27 NOTE — PROVIDER PROGRESS NOTE
Subjective





- Subjective


Subjective: 





S: Bonding well with baby. Breastfeeding with some difficulty. Bleeding 

decreased and is light. Pain well controlled with oxycodone. Discussed 

transitioning to ibuprofen and tylenol as I will not send her home with an 

oxycodone Rx. Mood is good. She states she is sore but feels relaxed.


O: /79, HR 17, RR 66, T 36.8


Heart RRR w/o M/G/R, lungs CTAB, abdomen soft and nontender, perineum intact, 

light lochia rubra, bilateral LE's trace edema


A: 25yo -->P1 Day of delivery s/p TSVD viable male infant


1st degree perineal laceration -intact


breastfeeding


P: Continue routine pp care and medications.


Evaluate for discharge home tomorrow.





Objective





- Vital Signs/Intake & Output


Vital Signs: 


                                Vital Signs x48h











  Temp Pulse Resp BP Pulse Ox


 


 10/27/21 17:43  36.8 C  66  17  114/79  99


 


 10/27/21 12:19  37.3 C  71  16  112/73  100











Intake & Output: 


                                 Intake & Output











 10/24/21 10/25/21 10/26/21 10/27/21





 23:59 23:59 23:59 23:59


 


Intake Total   981.25 1136.5


 


Output Total    1100


 


Balance   981.25 36.5














- Lab Results


Fish Bones: 


                                 10/26/21 09:00





Other Labs: 


                               Lab Results x24hrs











  10/27/21 Range/Units





  07:14 


 


Blood Type  A NEGATIVE  


 


Weak D (Du)  WEAK-D NEGATIVE  


 


Fetal Maternal Bleed  NEGATIVE  (NEGATIVE)

## 2021-10-28 VITALS — DIASTOLIC BLOOD PRESSURE: 70 MMHG | SYSTOLIC BLOOD PRESSURE: 124 MMHG

## 2021-10-28 RX ADMIN — OXYCODONE PRN MG: 5 TABLET ORAL at 03:12

## 2021-10-28 RX ADMIN — IBUPROFEN SCH MG: 800 TABLET, FILM COATED ORAL at 03:25

## 2021-10-28 RX ADMIN — ACETAMINOPHEN SCH MG: 500 TABLET ORAL at 13:35

## 2021-10-28 RX ADMIN — ACETAMINOPHEN SCH MG: 500 TABLET ORAL at 05:27

## 2021-10-28 RX ADMIN — OXYCODONE PRN MG: 5 TABLET ORAL at 13:37

## 2021-10-28 RX ADMIN — DOCUSATE SODIUM SCH MG: 100 CAPSULE, LIQUID FILLED ORAL at 09:09

## 2021-10-28 RX ADMIN — SODIUM CHLORIDE, PRESERVATIVE FREE SCH ML: 5 INJECTION INTRAVENOUS at 11:40

## 2021-10-28 RX ADMIN — HYDROCORTISONE PRN TUB: 0.01 CREAM TOPICAL at 13:37

## 2021-10-28 RX ADMIN — FAMOTIDINE SCH MG: 20 TABLET, FILM COATED ORAL at 09:09

## 2021-10-28 RX ADMIN — HYDROCORTISONE PRN APPLIC: 0.01 CREAM TOPICAL at 03:25

## 2021-10-28 RX ADMIN — IBUPROFEN SCH MG: 800 TABLET, FILM COATED ORAL at 09:11

## 2021-10-28 NOTE — DISCHARGE PLAN
Discharge Plan


Problem Reviewed?: Yes


Disposition: 01 Home, Self Care


Condition: Good


Diet: Regular


Activity Restrictions: No Restrictions


Shower Restrictions: No


Driving Restrictions: No


Weight Bearing: Full Weight


No Smoking: If you smoke, Please STOP!  Call [phone] for help.


Follow-up with: 


Daxa Mckeon CNM, ARNP [Provider Admit Priv/Credential] -

## 2021-10-28 NOTE — DISCHARGE SUMMARY
Discharge Summary


Condition at Discharge: Good


Discharge Disposition: 01 Home, Self Care





- HOSPITAL COURSE


Hospital Course: 





Date of admission: 10/26/2021





Date of discharge: 10/28/2021





Diagnosis on Admission:


1. 27yo  @ 41.5wks gestation by LMP c/w 10.0wk U/S


2. Postdates pregnancy


3. GBS positive - penicillin allergic


4. FHR Category I





Diagnosis on discharge:


1. 27yo  s/p TSVD viable male infant


2. 1st degree perineal laceration 


3. breastfeeding


4. normal recovery





Brief history:


She is a patient of [facility] who presented on 10/26/2021 for 

medical induction of labor secondary to postdates pregnancy. She received 2 

doses of 50mcg BC misoprostol for effective pre-induction cervical ripening. 

Pitocin initiated for induction of labor with a maximum infusion rate of 

10mU/mL. AROM for labor augmentation. Pt progressed to spontaneously deliver a 

viable male infant on 10/27/2021 @ 0120. Apgars were 8/8 at 1 and 5 minutes 

respectively. EBL 150mL. 1st degree perineal laceration repaired in standard 

fashion and under sterile conditions.


She has been doing well in her postpartum course. She is ambulating and 

tolerating a regular diet. She is urinating without difficulty and her lochia is

normal. Her pain is well controlled with oral medications. She will be 

discharged home today on PPD#1 with instrcutions to continue taking her prenatal

vitamin while breastfeeding. Rx for 1000mg Tylenol and 800mg Ibuprofen q 8hrs 

PRN pain sent to [facility] per pt request. She intends to f/u with myself at 

[facility] in 1 week for routine prenatal visit or sooner if 

needed. She has been given precautions to call if she has any worsening fevers, 

chills, abdominal pain, increased vaginal bleeding or foul smelling vaginal 

lochia.








Physical Exam:


Normocephalic, atraumtic, heart RRR w/o M/G/R, abdomen soft and nontender with 

fundus firm at U-1, perineum intact, light lochia rubra, bilateral LE's trace 

edema.





- ALLERGIES


Allergies/Adverse Reactions: 


                                    Allergies











Allergy/AdvReac Type Severity Reaction Status Date / Time


 


Penicillins Allergy  Anaphylaxis Verified 21 21:12














- MEDICATIONS


Home Medications: 


                                Ambulatory Orders











 Medication  Instructions  Recorded  Confirmed


 


Metoclopramide [Reglan] 10 mg PO Q6H PRN #14 tablet 21 


 


Ondansetron Odt [Zofran] 4 mg TL Q6H PRN #10 tablet 21 


 


Famotidine [Acid-Pep] 20 mg PO DAILY #60 tablet 08/10/21 














- LABS


Result Diagrams: 


                                 10/26/21 09:00

## 2021-12-01 ENCOUNTER — HOSPITAL ENCOUNTER (EMERGENCY)
Dept: HOSPITAL 76 - ED | Age: 26
LOS: 1 days | Discharge: HOME | End: 2021-12-02
Payer: COMMERCIAL

## 2021-12-01 ENCOUNTER — HOSPITAL ENCOUNTER (OUTPATIENT)
Dept: HOSPITAL 76 - EMS | Age: 26
Discharge: ANOTHER HOSPITAL | End: 2021-12-01
Payer: COMMERCIAL

## 2021-12-01 DIAGNOSIS — R19.7: ICD-10-CM

## 2021-12-01 DIAGNOSIS — R11.2: Primary | ICD-10-CM

## 2021-12-01 DIAGNOSIS — Z20.822: ICD-10-CM

## 2021-12-01 LAB
BASOPHILS NFR BLD AUTO: 0 10^3/UL (ref 0–0.1)
BASOPHILS NFR BLD AUTO: 0.2 %
EOSINOPHIL # BLD AUTO: 0.1 10^3/UL (ref 0–0.7)
EOSINOPHIL NFR BLD AUTO: 0.9 %
ERYTHROCYTE [DISTWIDTH] IN BLOOD BY AUTOMATED COUNT: 12.3 % (ref 12–15)
HCT VFR BLD AUTO: 40.6 % (ref 37–47)
HGB UR QL STRIP: 13.5 G/DL (ref 12–16)
LYMPHOCYTES # SPEC AUTO: 0.5 10^3/UL (ref 1.5–3.5)
LYMPHOCYTES NFR BLD AUTO: 4.6 %
MCH RBC QN AUTO: 30.2 PG (ref 27–31)
MCHC RBC AUTO-ENTMCNC: 33.3 G/DL (ref 32–36)
MCV RBC AUTO: 90.8 FL (ref 81–99)
MONOCYTES # BLD AUTO: 0.5 10^3/UL (ref 0–1)
MONOCYTES NFR BLD AUTO: 4.6 %
NEUTROPHILS # BLD AUTO: 9.2 10^3/UL (ref 1.5–6.6)
NEUTROPHILS # SNV AUTO: 10.2 X10^3/UL (ref 4.8–10.8)
NEUTROPHILS NFR BLD AUTO: 89.6 %
NRBC # BLD AUTO: 0 /100WBC
NRBC # BLD AUTO: 0 X10^3/UL
PDW BLD AUTO: 9.7 FL (ref 7.9–10.8)
PLATELET # BLD: 234 10^3/UL (ref 130–450)
RBC MAR: 4.47 10^6/UL (ref 4.2–5.4)

## 2021-12-01 PROCEDURE — 36415 COLL VENOUS BLD VENIPUNCTURE: CPT

## 2021-12-01 PROCEDURE — 99283 EMERGENCY DEPT VISIT LOW MDM: CPT

## 2021-12-01 PROCEDURE — 80053 COMPREHEN METABOLIC PANEL: CPT

## 2021-12-01 PROCEDURE — 96361 HYDRATE IV INFUSION ADD-ON: CPT

## 2021-12-01 PROCEDURE — 0202U NFCT DS 22 TRGT SARS-COV-2: CPT

## 2021-12-01 PROCEDURE — 85025 COMPLETE CBC W/AUTO DIFF WBC: CPT

## 2021-12-01 PROCEDURE — 96374 THER/PROPH/DIAG INJ IV PUSH: CPT

## 2021-12-01 PROCEDURE — 83690 ASSAY OF LIPASE: CPT

## 2021-12-01 NOTE — ED PHYSICIAN DOCUMENTATION
PD HPI NVD





- Stated complaint


Stated Complaint: N/V/D





- Chief complaint


Chief Complaint: Abd Pain





- History obtained from


History obtained from: Patient





- History of Present Illness


Timing - onset: Enter  time (20:00), Today


Timing - details: Abrupt onset


Pain level max: 0


Pain level now: 0


Associated symptoms: No: Fever, Abdominal pain, Hematemesis, Melena, 

Hematochezia


Contributing factors: Sick contact ( with similar symptoms)


Worsened by: Eating


Similar symptoms before: Has not had sx before


Recently seen: Not recently seen





- Additonal information


Additional information: 





c/o nausea, vomiting, diarrhea since 8 PM today. She is not COVID vaccinated. 

She denies fevers.  has similar symptoms. Patient is breast feeding, had 

induced vaginal delivery 10/27 (2 weeks post ROHINI), and her pregnancy was 

complicated by hyperemesis gravidarum. 





Review of Systems


Constitutional: reports: Reviewed and negative


Cardiac: reports: Reviewed and negative


Respiratory: reports: Reviewed and negative


GI: reports: Nausea, Vomiting, Diarrhea.  denies: Abdominal Pain


: denies: Dysuria, Frequency





PD PAST MEDICAL HISTORY





- Past Medical History


Cardiovascular: None


Respiratory: None


Neuro: None


Endocrine/Autoimmune: None


GI: None


GYN: None


: None


HEENT: None


Psych: None


Musculoskeletal: None


Derm: None





- Past Surgical History


Past Surgical History: No





- Present Medications


Home Medications: 


                                Ambulatory Orders











 Medication  Instructions  Recorded  Confirmed


 


Metoclopramide [Reglan] 10 mg PO Q6H PRN #14 tablet 03/18/21 


 


Ondansetron Odt [Zofran] 4 mg TL Q6H PRN #10 tablet 03/18/21 


 


Famotidine [Acid-Pep] 20 mg PO DAILY #60 tablet 08/10/21 


 


Ondansetron Odt [Zofran] 4 mg TL Q6H PRN #14 tablet 12/02/21 














- Allergies


Allergies/Adverse Reactions: 


                                    Allergies











Allergy/AdvReac Type Severity Reaction Status Date / Time


 


Penicillins Allergy  Anaphylaxis Verified 12/01/21 22:55














- Social History


Does the pt smoke?: No


Smoking Status: Never smoker


Does the pt drink ETOH?: No


Does the pt have substance abuse?: No





- Immunizations


Immunizations are current?: Yes





- POLST


Patient has POLST: No





PD ED PE NORMAL





- Vitals


Vital signs reviewed: Yes





- General


General: Alert and oriented X 3, No acute distress, Well developed/nourished





- HEENT


HEENT: Moist mucous membranes





- Neck


Neck: Supple, no meningeal sign





- Cardiac


Cardiac: RRR, No murmur





- Respiratory


Respiratory: No respiratory distress, Clear bilaterally





- Abdomen


Abdomen: Normal bowel sounds, Soft, Non tender, Non distended





Results





- Vitals


Vitals: 


                                     Oxygen











O2 Source                      Room air

















- Labs


Labs: 


                                Laboratory Tests











  12/01/21 12/01/21 12/02/21





  23:45 23:45 00:07


 


WBC  10.2  


 


RBC  4.47  


 


Hgb  13.5  


 


Hct  40.6  


 


MCV  90.8  


 


MCH  30.2  


 


MCHC  33.3  


 


RDW  12.3  


 


Plt Count  234  


 


MPV  9.7  


 


Neut # (Auto)  9.2 H  


 


Lymph # (Auto)  0.5 L  


 


Mono # (Auto)  0.5  


 


Eos # (Auto)  0.1  


 


Baso # (Auto)  0.0  


 


Absolute Nucleated RBC  0.00  


 


Nucleated RBC %  0.0  


 


Sodium   139 


 


Potassium   4.3 


 


Chloride   108 


 


Carbon Dioxide   23 


 


Anion Gap   8.0 


 


BUN   14 


 


Creatinine   0.7 


 


Estimated GFR (MDRD)   102 


 


Glucose   109 H 


 


Calcium   8.1 L 


 


Total Bilirubin   0.6 


 


AST   22 


 


ALT   26 


 


Alkaline Phosphatase   68 


 


Total Protein   6.6 L 


 


Albumin   3.7 


 


Globulin   2.9 


 


Albumin/Globulin Ratio   1.3 


 


Lipase   28 


 


Nasal Adenovirus (PCR)    NOT DETECTED


 


Nasal B. parapertussis DNA (PCR)    NOT DETECTED


 


Nasal Coronavir 229E PCR    NOT DETECTED


 


Nasal Coronavir HKU1 PCR    NOT DETECTED


 


Nasal Coronavir NL63 PCR    NOT DETECTED


 


Nasal Coronavir OC43 PCR    NOT DETECTED


 


Nasal Enterovir/Rhinovir PCR    NOT DETECTED


 


Nasal Influenza B PCR    NOT DETECTED


 


Nasal Influenza A PCR    NOT DETECTED


 


Nasal Parainfluen 1 PCR    NOT DETECTED


 


Nasal Parainfluen 2 PCR    NOT DETECTED


 


Nasal Parainfluen 3 PCR    NOT DETECTED


 


Nasal Parainfluen 4 PCR    NOT DETECTED


 


Nasal RSV (PCR)    NOT DETECTED


 


Nasal B.pertussis DNA PCR    NOT DETECTED


 


Nasal C.pneumoniae (PCR)    NOT DETECTED


 


Mehran Human Metapneumo PCR    NOT DETECTED


 


Nasal M.pneumoniae (PCR)    NOT DETECTED


 


Nasal SARS-CoV-2 (PCR)    NOT DETECTED














PD MEDICAL DECISION MAKING





- ED course


Complexity details: reviewed results, re-evaluated patient, considered 

differential, d/w patient


ED course: 





no significant/concerning findings on blood tests (CBC, ER abdominal panel), and

 respiratory panel including COVID is negative. She is given 2 liters NS, 2 

doses of IV zofran 4mg/dose, and 4mg PO loperamide. Results d/w patient and on 

reevaluation, she reports feeling better and feeling well enough to be discha

rged home. Return precautions discussed.





Departure





- Departure


Disposition: 01 Home, Self Care


Clinical Impression: 


 Nausea vomiting and diarrhea





Condition: Good


Instructions:  ED Diet Vomiting Diarrhea, ED Vomiting Diarrhea Nonspecific Ad


Prescriptions: 


Ondansetron Odt [Zofran] 4 mg TL Q6H PRN #14 tablet


 PRN Reason: Nausea / Vomiting


Comments: 


A prescription for ondansetron (anti-nausea medication) has been electronically 

submitted to [facility] pharmacy in [place]


Discharge Date/Time: 12/02/21 03:03

## 2021-12-02 VITALS — SYSTOLIC BLOOD PRESSURE: 93 MMHG | DIASTOLIC BLOOD PRESSURE: 47 MMHG

## 2021-12-02 LAB
ALBUMIN DIAFP-MCNC: 3.7 G/DL (ref 3.2–5.5)
ALBUMIN/GLOB SERPL: 1.3 {RATIO} (ref 1–2.2)
ALP SERPL-CCNC: 68 IU/L (ref 42–121)
ALT SERPL W P-5'-P-CCNC: 26 IU/L (ref 10–60)
ANION GAP SERPL CALCULATED.4IONS-SCNC: 8 MMOL/L (ref 6–13)
AST SERPL W P-5'-P-CCNC: 22 IU/L (ref 10–42)
B PARAPERT DNA SPEC QL NAA+PROBE: NOT DETECTED
B PERT DNA SPEC QL NAA+PROBE: NOT DETECTED
BILIRUB BLD-MCNC: 0.6 MG/DL (ref 0.2–1)
BUN SERPL-MCNC: 14 MG/DL (ref 6–20)
C PNEUM DNA NPH QL NAA+NON-PROBE: NOT DETECTED
CALCIUM UR-MCNC: 8.1 MG/DL (ref 8.5–10.3)
CHLORIDE SERPL-SCNC: 108 MMOL/L (ref 101–111)
CO2 SERPL-SCNC: 23 MMOL/L (ref 21–32)
CREAT SERPLBLD-SCNC: 0.7 MG/DL (ref 0.4–1)
FLUAV RNA RESP QL NAA+PROBE: NOT DETECTED
GFRSERPLBLD MDRD-ARVRAT: 102 ML/MIN/{1.73_M2} (ref 89–?)
GLOBULIN SER-MCNC: 2.9 G/DL (ref 2.1–4.2)
GLUCOSE SERPL-MCNC: 109 MG/DL (ref 70–100)
HAEM INFLU B DNA SPEC QL NAA+PROBE: NOT DETECTED
HCOV 229E RNA SPEC QL NAA+PROBE: NOT DETECTED
HCOV HKU1 RNA UPPER RESP QL NAA+PROBE: NOT DETECTED
HCOV NL63 RNA ASPIRATE QL NAA+PROBE: NOT DETECTED
HCOV OC43 RNA SPEC QL NAA+PROBE: NOT DETECTED
HMPV AG SPEC QL: NOT DETECTED
HPIV1 RNA NPH QL NAA+PROBE: NOT DETECTED
HPIV2 SPEC QL CULT: NOT DETECTED
HPIV3 AB TITR SER CF: NOT DETECTED {TITER}
HPIV4 RNA SPEC QL NAA+PROBE: NOT DETECTED
LIPASE SERPL-CCNC: 28 U/L (ref 22–51)
M PNEUMO DNA SPEC QL NAA+PROBE: NOT DETECTED
POTASSIUM SERPL-SCNC: 4.3 MMOL/L (ref 3.5–5)
PROT SPEC-MCNC: 6.6 G/DL (ref 6.7–8.2)
RSV RNA RESP QL NAA+PROBE: NOT DETECTED
RV+EV RNA SPEC QL NAA+PROBE: NOT DETECTED
SARS-COV-2 RNA PNL SPEC NAA+PROBE: NOT DETECTED
SODIUM SERPLBLD-SCNC: 139 MMOL/L (ref 135–145)

## 2022-09-03 ENCOUNTER — HOSPITAL ENCOUNTER (OUTPATIENT)
Dept: HOSPITAL 76 - DI | Age: 27
Discharge: HOME | End: 2022-09-03
Attending: PHYSICIAN ASSISTANT
Payer: COMMERCIAL

## 2022-09-03 DIAGNOSIS — S93.402A: ICD-10-CM

## 2022-09-03 DIAGNOSIS — R60.0: Primary | ICD-10-CM

## 2022-09-03 NOTE — XRAY REPORT
PROCEDURE:  Ankle 3 View LT

 

INDICATIONS:  SPRAIN OF UNSPECIFIED LIGAMENT LT ANKLE

 

TECHNIQUE:  3 views of the ankle were acquired.  

 

COMPARISON:  None

 

FINDINGS:  

 

Bones:  No fractures or dislocations.  Ankle mortise is normally aligned.  No suspicious bony lesions
.  

 

Soft tissues: Lateral malleolar edema is present. Achilles tendon appears normal.  

 

IMPRESSION:  Lateral malleolar edema. No visualized acute fracture or dislocation. However, occult in
jury cannot be excluded. Recommend short interval imaging follow-up in 7-10 days as clinically indica
seven for additional evaluation.

 

Reviewed by: Shey Conn MD on 9/3/2022 12:42 PM PDT

Approved by: Shey Conn MD on 9/3/2022 12:42 PM PDT

 

 

Station ID:  [id number]

## 2022-12-12 ENCOUNTER — HOSPITAL ENCOUNTER (OUTPATIENT)
Dept: HOSPITAL 76 - DI.N | Age: 27
Discharge: HOME | End: 2022-12-12
Attending: PHYSICIAN ASSISTANT
Payer: COMMERCIAL

## 2022-12-12 DIAGNOSIS — R05.9: Primary | ICD-10-CM

## 2022-12-12 NOTE — XRAY REPORT
PROCEDURE:  Chest 2 View X-Ray

 

INDICATIONS:  COUGH

 

TECHNIQUE:  2 views of the chest were acquired.  

 

COMPARISON:  None

 

FINDINGS:  

 

Surgical changes and devices:  None.  

 

Lungs and pleura:  No pleural effusions or pneumothorax.  Lungs are clear.  

 

Mediastinum:  Mediastinal contours are normal.  Heart size is normal.  

 

Bones and chest wall:  No suspicious bony abnormalities.  Soft tissues appear unremarkable.  

 

IMPRESSION:  

Chest without acute cardiopulmonary abnormalities or focal airspace disease.

 

Reviewed by: Iain Adames MD on 12/12/2022 11:37 AM PST

Approved by: Iain Adames MD on 12/12/2022 11:37 AM PST

 

 

Station ID:  [id number]

## 2023-08-30 ENCOUNTER — HOSPITAL ENCOUNTER (EMERGENCY)
Dept: HOSPITAL 76 - ED | Age: 28
Discharge: LEFT BEFORE DISCHARGE | End: 2023-08-30
Payer: COMMERCIAL

## 2023-08-30 ENCOUNTER — HOSPITAL ENCOUNTER (EMERGENCY)
Age: 28
Discharge: HOME | End: 2023-08-30
Payer: OTHER GOVERNMENT

## 2023-08-30 VITALS — HEART RATE: 89 BPM | SYSTOLIC BLOOD PRESSURE: 113 MMHG | OXYGEN SATURATION: 100 % | DIASTOLIC BLOOD PRESSURE: 64 MMHG

## 2023-08-30 VITALS
HEART RATE: 80 BPM | TEMPERATURE: 97.9 F | DIASTOLIC BLOOD PRESSURE: 88 MMHG | OXYGEN SATURATION: 99 % | SYSTOLIC BLOOD PRESSURE: 122 MMHG | RESPIRATION RATE: 20 BRPM

## 2023-08-30 VITALS — HEART RATE: 73 BPM | DIASTOLIC BLOOD PRESSURE: 54 MMHG | OXYGEN SATURATION: 100 % | SYSTOLIC BLOOD PRESSURE: 95 MMHG

## 2023-08-30 VITALS — OXYGEN SATURATION: 100 % | HEART RATE: 82 BPM | DIASTOLIC BLOOD PRESSURE: 59 MMHG | SYSTOLIC BLOOD PRESSURE: 101 MMHG

## 2023-08-30 VITALS — SYSTOLIC BLOOD PRESSURE: 104 MMHG | OXYGEN SATURATION: 100 % | DIASTOLIC BLOOD PRESSURE: 52 MMHG | HEART RATE: 78 BPM

## 2023-08-30 VITALS — BODY MASS INDEX: 35.2 KG/M2

## 2023-08-30 DIAGNOSIS — Z3A.16: ICD-10-CM

## 2023-08-30 DIAGNOSIS — O21.0: Primary | ICD-10-CM

## 2023-08-30 DIAGNOSIS — Z53.21: Primary | ICD-10-CM

## 2023-08-30 LAB
ADD MANUAL DIFF / SLIDE REVIEW: NO
ALBUMIN SERPL-MCNC: 3.9 G/DL (ref 3.5–5)
ALBUMIN/GLOB SERPL: 1.1 {RATIO} (ref 1–2.8)
ALP SERPL-CCNC: 55 U/L (ref 38–126)
ALT SERPL-CCNC: 17 IU/L (ref ?–35)
ANION GAP SERPL CALCULATED.4IONS-SCNC: 8 MMOL/L (ref 6–13)
BASOPHILS NFR BLD AUTO: 0 10^3/UL (ref 0–0.1)
BASOPHILS NFR BLD AUTO: 0.2 %
BUN SERPL-MCNC: 12 MG/DL (ref 6–20)
BUN SERPL-MCNC: 13 MG/DL (ref 7–17)
CALCIUM SERPL-MCNC: 8.8 MG/DL (ref 8.4–10.2)
CALCIUM UR-MCNC: 9.3 MG/DL (ref 8.5–10.3)
CHLORIDE SERPL-SCNC: 102 MMOL/L (ref 101–111)
CHLORIDE SERPL-SCNC: 103 MMOL/L (ref 98–107)
CO2 SERPL-SCNC: 21 MMOL/L (ref 22–32)
CO2 SERPL-SCNC: 26 MMOL/L (ref 21–32)
CREAT SERPLBLD-SCNC: 0.6 MG/DL (ref 0.6–1.3)
CRYSTALS URNS MICRO: (no result)
EOSINOPHIL # BLD AUTO: 0.1 10^3/UL (ref 0–0.7)
EOSINOPHIL NFR BLD AUTO: 0.7 %
ERYTHROCYTE [DISTWIDTH] IN BLOOD BY AUTOMATED COUNT: 13.5 % (ref 12–15)
ESTIMATED GLOMERULAR FILT RATE: > 60 ML/MIN (ref 60–?)
GFRSERPLBLD MDRD-ARVRAT: 120 ML/MIN/{1.73_M2} (ref 89–?)
GLOBULIN SER CALC-MCNC: 3.4 G/DL (ref 1.7–4.1)
GLUCOSE SERPL-MCNC: 75 MG/DL (ref 74–104)
GLUCOSE SERPL-MCNC: 85 MG/DL (ref 70–100)
HCT VFR BLD AUTO: 38.8 % (ref 37–47)
HEMATOCRIT: 37.4 % (ref 36–46)
HEMOGLOBIN: 12.9 G/DL (ref 12–16)
HEMOLYSIS: < 15 (ref 0–50)
HGB UR QL STRIP: 13.3 G/DL (ref 12–16)
LYMPHOCYTES # SPEC AUTO: 2.5 10^3/UL (ref 1.5–3.5)
LYMPHOCYTES # SPEC AUTO: 2300 /UL (ref 1100–4500)
LYMPHOCYTES NFR BLD AUTO: 20.6 %
MCH RBC QN AUTO: 30 PG (ref 27–31)
MCHC RBC AUTO-ENTMCNC: 34.3 G/DL (ref 32–36)
MCV RBC AUTO: 87.6 FL (ref 81–99)
MCV RBC: 85.6 FL (ref 80–100)
MEAN CORPUSCULAR HEMOGLOBIN: 29.6 PG (ref 26–34)
MEAN CORPUSCULAR HGB CONC: 34.5 % (ref 30–36)
MONOCYTES # BLD AUTO: 0.6 10^3/UL (ref 0–1)
MONOCYTES NFR BLD AUTO: 5.2 %
NEUTROPHILS # BLD AUTO: 8.8 10^3/UL (ref 1.5–6.6)
NEUTROPHILS # SNV AUTO: 12.1 X10^3/UL (ref 4.8–10.8)
NEUTROPHILS NFR BLD AUTO: 72.9 %
NRBC # BLD AUTO: 0 /100WBC
NRBC # BLD AUTO: 0 X10^3/UL
PDW BLD AUTO: 10.1 FL (ref 7.9–10.8)
PLATELET # BLD: 294 10^3/UL (ref 130–450)
PLATELET COUNT: 267 X10^3/UL (ref 150–400)
POTASSIUM SERPL-SCNC: 4 MMOL/L (ref 3.4–5.1)
POTASSIUM SERPL-SCNC: 4 MMOL/L (ref 3.5–4.5)
PROT SERPL-MCNC: 7.3 G/DL (ref 6.3–8.2)
RBC MAR: 4.43 10^6/UL (ref 4.2–5.4)
SODIUM SERPL-SCNC: 135 MMOL/L (ref 137–145)
SODIUM SERPLBLD-SCNC: 136 MMOL/L (ref 135–145)

## 2023-08-30 PROCEDURE — 86900 BLOOD TYPING SEROLOGIC ABO: CPT

## 2023-08-30 PROCEDURE — 96374 THER/PROPH/DIAG INJ IV PUSH: CPT

## 2023-08-30 PROCEDURE — 86803 HEPATITIS C AB TEST: CPT

## 2023-08-30 PROCEDURE — 87389 HIV-1 AG W/HIV-1&-2 AB AG IA: CPT

## 2023-08-30 PROCEDURE — 80053 COMPREHEN METABOLIC PANEL: CPT

## 2023-08-30 PROCEDURE — 99284 EMERGENCY DEPT VISIT MOD MDM: CPT

## 2023-08-30 PROCEDURE — 86850 RBC ANTIBODY SCREEN: CPT

## 2023-08-30 PROCEDURE — 85025 COMPLETE CBC W/AUTO DIFF WBC: CPT

## 2023-08-30 PROCEDURE — 81003 URINALYSIS AUTO W/O SCOPE: CPT

## 2023-08-30 PROCEDURE — 86787 VARICELLA-ZOSTER ANTIBODY: CPT

## 2023-08-30 PROCEDURE — 96361 HYDRATE IV INFUSION ADD-ON: CPT

## 2023-08-30 PROCEDURE — 87340 HEPATITIS B SURFACE AG IA: CPT

## 2023-08-30 PROCEDURE — 36415 COLL VENOUS BLD VENIPUNCTURE: CPT

## 2023-08-30 PROCEDURE — 86901 BLOOD TYPING SEROLOGIC RH(D): CPT

## 2023-08-30 PROCEDURE — 80048 BASIC METABOLIC PNL TOTAL CA: CPT

## 2023-08-30 PROCEDURE — 81015 MICROSCOPIC EXAM OF URINE: CPT

## 2023-08-30 PROCEDURE — 86592 SYPHILIS TEST NON-TREP QUAL: CPT

## 2023-08-30 PROCEDURE — 86762 RUBELLA ANTIBODY: CPT

## 2023-08-31 VITALS — DIASTOLIC BLOOD PRESSURE: 70 MMHG | OXYGEN SATURATION: 99 % | SYSTOLIC BLOOD PRESSURE: 124 MMHG

## 2023-08-31 LAB — VZV IGG SER IA-ACNC: 286 INDEX

## 2023-10-18 ENCOUNTER — HOSPITAL ENCOUNTER (OUTPATIENT)
Dept: HOSPITAL 76 - DI | Age: 28
Discharge: HOME | End: 2023-10-18
Attending: ADVANCED PRACTICE MIDWIFE
Payer: COMMERCIAL

## 2023-10-18 DIAGNOSIS — Z36.89: ICD-10-CM

## 2023-10-18 DIAGNOSIS — Z34.02: Primary | ICD-10-CM

## 2023-10-18 NOTE — ULTRASOUND REPORT
PROCEDURE:  OB F/U or Repeat

 

INDICATIONS:  SUPERVISION OF PREGNANCY

 

OUTSIDE/PRIOR DATING DATA:  

Last menstrual period (LMP): 5/6/2023.  

LMP-based estimated date of delivery (ROHINI): 2/10/2023.  

First dating scan (date and location): 9/21/2023.  

Estimated date of delivery (ROHINI) from first dating scan: 2/10/2024.  

The below data below was generated using the clinical ROHINI of 2/10/2024

 

TECHNIQUE: 

Real-time scanning was performed of the fetus, with image documentation and biometric measurements.  


Endovaginal scanning:  Not performed.

 

COMPARISON:  Ultrasound 9/21/2023

 

FINDINGS:  

 

General:  A single living intrauterine gestation is present.  

Presentation:  Transverse

Placenta:  Placental position is posterior, without previa.  

Amniotic fluid index:  16.9 cm, within normal limits for gestational age.  

Fetal heart rate:  126 beats per minute.  

Maternal cervical canal:  5.3 cm long; normal length is 2.5 cm or more.  

Estimated gestational age from initial scan: 23 weeks and 4 days

 

Limited anatomic scan: Normal appearance of the heart including the four-chamber view, RVOT and LVOT.


 

IMPRESSION:  

 

1.Normal appearance of the fetal heart.

2.Single live intrauterine pregnancy consistent with 23 weeks and 4 days.

 

Reviewed by: Joe Kaur MD on 10/18/2023 2:29 PM PDT

Approved by: Joe Kaur MD on 10/18/2023 2:29 PM PDT

 

 

Station ID:  [id number]

## 2023-12-27 ENCOUNTER — HOSPITAL ENCOUNTER (OUTPATIENT)
Dept: HOSPITAL 76 - WFO | Age: 28
Discharge: HOME | End: 2023-12-27
Attending: ADVANCED PRACTICE MIDWIFE
Payer: COMMERCIAL

## 2023-12-27 VITALS — OXYGEN SATURATION: 100 %

## 2023-12-27 VITALS — DIASTOLIC BLOOD PRESSURE: 66 MMHG | SYSTOLIC BLOOD PRESSURE: 115 MMHG

## 2023-12-27 DIAGNOSIS — O21.1: ICD-10-CM

## 2023-12-27 DIAGNOSIS — O99.891: Primary | ICD-10-CM

## 2023-12-27 DIAGNOSIS — Z3A.33: ICD-10-CM

## 2023-12-27 DIAGNOSIS — R19.7: ICD-10-CM

## 2023-12-27 DIAGNOSIS — R10.30: ICD-10-CM

## 2023-12-27 PROCEDURE — 99214 OFFICE O/P EST MOD 30 MIN: CPT

## 2023-12-27 PROCEDURE — 59025 FETAL NON-STRESS TEST: CPT

## 2023-12-27 PROCEDURE — 96374 THER/PROPH/DIAG INJ IV PUSH: CPT

## 2023-12-27 PROCEDURE — 96361 HYDRATE IV INFUSION ADD-ON: CPT

## 2023-12-27 PROCEDURE — 96360 HYDRATION IV INFUSION INIT: CPT

## 2023-12-27 PROCEDURE — 96372 THER/PROPH/DIAG INJ SC/IM: CPT

## 2023-12-31 NOTE — PROVIDER PROGRESS NOTE
- HPI


Chief Complaint: GI symptoms


Current Pregnancy: 


                                                               Current Pregnancy





EDU                              24


Gestation                        33 Weeks and 3 Days


                          4


Para                             1





Vital Signs











Temperature  37.4 C   23 17:01


 


Heart Rate  96   23 17:01


 


Respiratory Rate  22   23 17:01


 


Blood Pressure  128/68   23 17:01


 


O2 Saturation  100   23 17:01




















Temperature  37.4 C   23 17:08


 


Heart Rate  90   23 19:10


 


Respiratory Rate  18   23 19:10


 


Blood Pressure  115/66   23 19:10


 


O2 Saturation  100   23 19:10


 


If not protocol: Oxygen Flow, liters/minute      














- Procedures


OB Procedure Performed: NST


Diagnosis/Indication for NST: Other


NST Procedure: 


NST Procedure





Start Date                       23


Start Time                       17:01


Stop Time                        17:53


Vibroacoustic Stimulation Used   No


Patient States Fetal Movement    Yes











- Plan


Plan: 





Rigoberto is a 27yo  @ 33.4wks gestation who presents to [facility] with c/o lower

abdominal pain. She has suffered from hyperemesis gravidarum for the duration of

her pregnancy. She states the past several days have been difficult for her and 

she is both coughing and vomiting. She denies vaginal bleeding or leakage of 

fluid. She states she does not feel like her pain is contraction pain but she 

wants to be sure. She reports +FM. She states she has had diarrhea for the past 

24 hours as well.





NST reactive. FHR baseline 130s, moderate variability, + accels, no decels


No contractions appreciated via tocometry





Pt requests IV hydration secondary to recurrent vomiting and diarrhea and I am 

in agreement with this request.


1 Liter LR administered over 1 hour and pt released home with precautions.


She has emergency contact number.


She denies further questions or concerns at this time.


F/u for regularly scheduled return OB visit or sooner PRN.


Pt verbalized understanding and agrees to above plan.








FINAL DIAGNOSIS:


Lower abdominal pain in pregnancy


Diarrhea


Hyperemesis gravidarum


Dehydration

## 2024-01-02 ENCOUNTER — HOSPITAL ENCOUNTER (OUTPATIENT)
Dept: HOSPITAL 76 - WFO | Age: 29
Discharge: HOME | End: 2024-01-02
Attending: ADVANCED PRACTICE MIDWIFE
Payer: COMMERCIAL

## 2024-01-02 VITALS — SYSTOLIC BLOOD PRESSURE: 111 MMHG | DIASTOLIC BLOOD PRESSURE: 76 MMHG

## 2024-01-02 DIAGNOSIS — O99.891: Primary | ICD-10-CM

## 2024-01-02 DIAGNOSIS — Z3A.34: ICD-10-CM

## 2024-01-02 DIAGNOSIS — O21.0: ICD-10-CM

## 2024-01-02 DIAGNOSIS — R42: ICD-10-CM

## 2024-01-02 PROCEDURE — 96374 THER/PROPH/DIAG INJ IV PUSH: CPT

## 2024-01-02 PROCEDURE — 99214 OFFICE O/P EST MOD 30 MIN: CPT

## 2024-01-02 PROCEDURE — 96361 HYDRATE IV INFUSION ADD-ON: CPT

## 2024-01-04 NOTE — PROVIDER PROGRESS NOTE
- HPI


Chief Complaint: GI symptoms


Current Pregnancy: 


                                                               Current Pregnancy





EDU                              24


Gestation                        34 Weeks and 2 Days


                          4


Para                             1





Vital Signs











Temperature  36.5 C   24 17:09


 


Heart Rate  98   24 17:09


 


Respiratory Rate  16   24 17:09


 


Blood Pressure  111/76   24 17:09




















Temperature  36.5 C   24 17:09


 


Heart Rate  98   24 17:09


 


Respiratory Rate  16   24 17:09


 


Blood Pressure  111/76   24 17:09


 


O2 Saturation      


 


If not protocol: Oxygen Flow, liters/minute      














- Procedures


OB Procedure Performed: NST


Diagnosis/Indication for NST: Decreased fetal movement


NST Procedure: 


NST Procedure





Start Date                       24


Start Time                       17:06


Stop Time                        17:44


Vibroacoustic Stimulation Used   No


Patient States Fetal Movement    Yes: decreased today











- Plan


Plan: 





Rigoberto is a 27yo  who presents today with c/o vomiting and dizziness. She 

has suffered from hyperemesis gravidarum for the duration of her pregnancy and 

has intermittently required IV hydration for dehydration. She states over the 

past week she has not felt dehydrated but had to take a car ride earlier today 

which induced significant vomiting and was accompanied by dizziness and 

lightheadedness. In addition she felt the baby's movements were decreased which 

she states is not all that uncommon during her times of excessive vomiting but 

accompanied by the dizziness made her and her  feel like she should 

present for evaluation. She denies vaginal bleeding or leakage of fluid. She 

denies contractions.





FHR baseline 130s, moderate variability, + accels, no decels


No contractions appreciated via tocometry





1 liter LR initiated over 1 hour


Reglan administered and pt reports improved symptoms





Pt released home with precautions.


Pt has emergency contact number.


We reviewed warning s/sx and when to present.


Follow for routine prenatal visit as scheduled or sooner PRN.


Pt verbalized understanding and agrees to above plan. She denies further 

questions or concerns at this time.








FINAL DIAGNOSIS:


Dizziness and lightheadedness


Hyperemesis gravidarum

## 2024-02-03 ENCOUNTER — HOSPITAL ENCOUNTER (INPATIENT)
Dept: HOSPITAL 76 - WFO | Age: 29
LOS: 1 days | Discharge: HOME | End: 2024-02-04
Attending: ADVANCED PRACTICE MIDWIFE | Admitting: ADVANCED PRACTICE MIDWIFE
Payer: COMMERCIAL

## 2024-02-03 DIAGNOSIS — Z3A.38: ICD-10-CM

## 2024-02-03 DIAGNOSIS — E66.01: ICD-10-CM

## 2024-02-03 DIAGNOSIS — O36.0930: ICD-10-CM

## 2024-02-03 LAB
BASOPHILS NFR BLD AUTO: 0 10^3/UL (ref 0–0.1)
BASOPHILS NFR BLD AUTO: 0.2 %
EOSINOPHIL # BLD AUTO: 0.1 10^3/UL (ref 0–0.7)
EOSINOPHIL NFR BLD AUTO: 0.8 %
ERYTHROCYTE [DISTWIDTH] IN BLOOD BY AUTOMATED COUNT: 14.1 % (ref 12–15)
HCT VFR BLD AUTO: 34.8 % (ref 37–47)
HGB UR QL STRIP: 11 G/DL (ref 12–16)
IGF BP1 VAG QL: NEGATIVE
LYMPHOCYTES # SPEC AUTO: 1.8 10^3/UL (ref 1.5–3.5)
LYMPHOCYTES NFR BLD AUTO: 17.7 %
MCH RBC QN AUTO: 26.3 PG (ref 27–31)
MCHC RBC AUTO-ENTMCNC: 31.6 G/DL (ref 32–36)
MCV RBC AUTO: 83.1 FL (ref 81–99)
MONOCYTES # BLD AUTO: 0.5 10^3/UL (ref 0–1)
MONOCYTES NFR BLD AUTO: 4.6 %
NEUTROPHILS # BLD AUTO: 7.9 10^3/UL (ref 1.5–6.6)
NEUTROPHILS # SNV AUTO: 10.4 X10^3/UL (ref 4.8–10.8)
NEUTROPHILS NFR BLD AUTO: 76.1 %
NRBC # BLD AUTO: 0 /100WBC
NRBC # BLD AUTO: 0 X10^3/UL
PDW BLD AUTO: 10.5 FL (ref 7.9–10.8)
PLATELET # BLD: 286 10^3/UL (ref 130–450)
RBC MAR: 4.19 10^6/UL (ref 4.2–5.4)

## 2024-02-03 PROCEDURE — 86900 BLOOD TYPING SEROLOGIC ABO: CPT

## 2024-02-03 PROCEDURE — 86850 RBC ANTIBODY SCREEN: CPT

## 2024-02-03 PROCEDURE — 84112 EVAL AMNIOTIC FLUID PROTEIN: CPT

## 2024-02-03 PROCEDURE — 85025 COMPLETE CBC W/AUTO DIFF WBC: CPT

## 2024-02-03 PROCEDURE — 86880 COOMBS TEST DIRECT: CPT

## 2024-02-03 PROCEDURE — 59025 FETAL NON-STRESS TEST: CPT

## 2024-02-03 PROCEDURE — 86870 RBC ANTIBODY IDENTIFICATION: CPT

## 2024-02-03 PROCEDURE — 99215 OFFICE O/P EST HI 40 MIN: CPT

## 2024-02-03 PROCEDURE — 59409 OBSTETRICAL CARE: CPT

## 2024-02-03 PROCEDURE — 86901 BLOOD TYPING SEROLOGIC RH(D): CPT

## 2024-02-03 RX ADMIN — ACETAMINOPHEN SCH MG: 500 TABLET ORAL at 20:39

## 2024-02-03 RX ADMIN — IBUPROFEN SCH MG: 800 TABLET, FILM COATED ORAL at 20:40

## 2024-02-03 RX ADMIN — DOCUSATE SODIUM SCH MG: 100 CAPSULE, LIQUID FILLED ORAL at 20:40

## 2024-02-03 RX ADMIN — WITCH HAZEL PRN PAD: 500 SOLUTION RECTAL; TOPICAL at 21:34

## 2024-02-03 RX ADMIN — FAMOTIDINE SCH MG: 10 INJECTION INTRAVENOUS at 20:40

## 2024-02-03 RX ADMIN — Medication SCH MLS/HR: at 16:58

## 2024-02-03 NOTE — ANESTHESIA
Pre-Anesthesia VS, & Labs





- Diagnosis





LABOR EPIDURal





- Procedure





LABOR EPIDURAL


Vital Signs: 





                                        











Temp Pulse Resp BP Pulse Ox O2 Flow Rate


 


 36.5 C   95   20   126/74       


 


 24 13:00  24 11:39  24 11:39  24 11:39      











Height: 5 ft 4 in


Weight (kg): 115.212 kg


Body Mass Index: 43.6


BMI Classification: Morbidly Obese





- Pregnancy


Is Patient Pregnant?: Yes





- Lab Results


Current Lab Results: 





Laboratory Tests





24 13:00: WBC 10.4, RBC 4.19 L, Hgb 11.0 L, Hct 34.8 L, MCV 83.1, MCH 26.3

L, MCHC 31.6 L, RDW 14.1, Plt Count 286, MPV 10.5, Neut # (Auto) 7.9 H, Lymph # 

(Auto) 1.8, Mono # (Auto) 0.5, Eos # (Auto) 0.1, Baso # (Auto) 0.0, Absolute 

Nucleated RBC 0.00, Nucleated RBC % 0.0


24 13:00: Blood Type A NEGATIVE, Antibody Screen POSITIVE








Fish Bones: 


                                 24 13:00








Home Medications and Allergies


Home Medications: 


Ambulatory Orders





hydrOXYzine pamoate [Hydroxyzine Pamoate] 50 mg PO QPM PRN 24 











Active Medications





Carboprost Tromethamine (Carboprost Tromethamine 250 Mcg/Ml Amp)  250 mcg IM 

.ONCE PRN


   PRN Reason: Hemorrhage


Fentanyl (Fentanyl 100 Mcg/2 Ml Vial)  50 mcg IVP Q1H PRN


   PRN Reason: Severe Pain (score 7-10)


Hydralazine HCl (Hydralazine Inj 20 Mg/Ml Vial)  5 - 10 mg IVP Q20M PRN; 

Protocol


   PRN Reason: SBP> or= 160 OR DBP> or= 110


Hydralazine HCl (Hydralazine Inj 20 Mg/Ml Vial)  10 mg IVP .ONCE PRN; Protocol


   PRN Reason: SBP> or= 160 OR DBP> or= 110


Lactated Ringer's (Lr)  500 mls @ 999 mls/hr IV PRN PRN


   PRN Reason: PER PHYSICIAN ORDER


Oxytocin/Sodium Chloride (Pitocin/Sodium Chloride)  500 mls @ 999 mls/hr IV PRN 

PRN; Protocol


   PRN Reason: POST-PARTUM HEMORR PREVENTION


Tranexamic Acid (Tranexamic 1,000 Mg/100ml-Nacl)  1,000 mg in 100 mls @ 600 

mls/hr IV Q30M PRN


   PRN Reason: EBL >1200mL and within 3hr


Labetalol HCl (Labetalol 20 Mg/4 Ml Syringe)  20 - 80 mg IVP Q10M PRN; Protocol


   PRN Reason: SBP> or= 160 OR DBP> or= 110


Labetalol HCl (Labetalol 20 Mg/4 Ml Syringe)  20 mg IVP .ONCE PRN; Protocol


   PRN Reason: SBP> or= 160 OR DBP> or= 110


Labetalol HCl (Labetalol 20 Mg/4 Ml Syringe)  20 - 40 mg IVP Q10M PRN; Protocol


   PRN Reason: SBP> or= 160 OR DBP> or= 110


Lidocaine HCl (Lidocaine 1% 20 Ml Mdv)  20 ml ID .ONCE PRN


   PRN Reason: PERINEAL REPAIR


   Stop: 24 12:44


Methylergonovine Maleate (Methylergonovine 0.2 Mg/Ml Vial)  0.2 mg IM .ONCE PRN


   PRN Reason: Hemorrhage


Misoprostol (Misoprostol 200 Mcg Tablet)  600 mcg BC .ONCE PRN


   PRN Reason: Hemorrhage


Misoprostol (Misoprostol 200 Mcg Tablet)  800 mcg VA .ONCE PRN


   PRN Reason: Hemorrhage


Nifedipine (Nifedipine 10 Mg Capsule)  10 - 20 mg PO Q20M PRN; Protocol


   PRN Reason: SBP> or= 160 OR DBP> or= 110


Ondansetron HCl (Ondansetron 4 Mg/2 Ml Vial)  4 mg IVP Q4HR PRN


   PRN Reason: Nausea / Vomiting


Oxytocin (Oxytocin 10 Unit/Ml Vial)  10 unit IM .ONCE PRN


   PRN Reason: Step One if no IV access.


Sodium Chloride (Sodium Chloride Flush 0.9% 10 Ml Syringe)  10 ml IVP PRN PRN


   PRN Reason: AS NEEDED PER PROVIDER ORDERS


Sodium Chloride (Sodium Chloride Flush 0.9% 10 Ml Syringe)  10 ml IVP Q8H ROBYN


Terbutaline Sulfate (Terbutaline 1 Mg/Ml Vial)  0.25 mg SUBQ .ONCE PRN


   PRN Reason: Tachystole





                                        





hydrOXYzine pamoate [Hydroxyzine Pamoate] 50 mg PO QPM PRN 24 








Allergies/Adverse Reactions: 


                                    Allergies











Allergy/AdvReac Type Severity Reaction Status Date / Time


 


Penicillins Allergy  Anaphylaxis Verified 23 16:25














Anes History & Medical History





- Anesthetic History


Anesthesia Complications: reports: No previous complications


Family history of Anesthesia Complications: Denies


Family history of Malignant Hyperthermia: Denies





- Medical History


Cardiovascular: reports: None


Pulmonary: reports: None


Gastrointestinal: reports: None


Urinary: reports: None


Neuro: reports: None


Musculoskeletal: reports: None


Endocrine/Autoimmune: reports: None


Blood Disorders: reports: None


Skin: reports: None


Smoking Status: Never smoker





- Obstetrical History


: 4


Parity: 1


Prenatal Events: reports: None


Pregnancy Complications: reports: None


OB Anesthesia History: 


previous labor epiduraL W/O PROBLEMS





Exam


General: Alert


Dental: WNL


Mouth Opening: 3 Fingerbreadth


Neck Mobility: Normal


Mallampati classification: II





Plan


Anesthesia Type: Epidural


Consent for Procedure(s) Verified and Reviewed: Yes


Code Status: Attempt Resuscitation


ASA classification: 1-Healthy patient


Is this case an emergency?: No

## 2024-02-03 NOTE — HISTORY & PHYSICAL EXAMINATION
Prenatal Admit History





- Visit Reason


Visit Reason: Contractions, Membranes rupture





- Pregnancy


: 4


Parity: 1


Premature: 0


Ectopic: 0


: 2


Prenatal Care: positive: [facility]


Prenatal Risk/History: positive: None


Pregnancy Complications This Pregnancy: positive: None


Smoking Status: Never smoker





- Mother's Labs


Mother's Blood Type: positive: A


Mother's RH: positive: Negative


GBS: positive: Group B Step Negative


Rubella Status: positive: Non-immune





- HPI





                                                               Current Pregnancy





EDU                              24


Gestation                        38 Weeks and 6 Days


                          4


Para                             1





Vital Signs











Temperature  36.5 C   24 11:37


 


Heart Rate  107 H  24 11:37


 


Respiratory Rate  20   24 11:37


 


Blood Pressure  126/74   24 11:37




















Temperature  36.5 C   24 11:39


 


Heart Rate  95   24 11:39


 


Respiratory Rate  20   24 11:39


 


Blood Pressure  126/74   24 11:39


 


O2 Saturation      


 


If not protocol: Oxygen Flow, liters/minute      














- NST Procedure





NST Procedure





Start Date                       24


Start Time                       11:30


Stop Time                        12:10


Vibroacoustic Stimulation Used   No


Patient States Fetal Movement    Yes











Meds/Allgy





- Home Medications


Home Medications: 


                                Ambulatory Orders











 Medication  Instructions  Recorded  Confirmed


 


Ondansetron Odt [Zofran] 4 mg TL Q6H PRN #10 tablet 23


 


Doxylamine Succinate [Unisom Sleep 25 mg PO TID #30 tablet 23 





Aid]   


 


Ondansetron Odt [Zofran] 4 mg TL Q6H PRN #20 tablet 23 


 


Prochlorperazine Supp [Compazine 25 mg LA QID PRN #8 supp 23 





Supp]   


 


Pyridoxine HCl (Vitamin B6) 25 mg PO BID #60 tablet 23 





[Vitamin B-6]   


 


Scopolamine Patch [Transderm-Scop] 1 each TOP Q3D #2 patch 23 














- Allergies


Allergies/Adverse Reactions: 


                                    Allergies











Allergy/AdvReac Type Severity Reaction Status Date / Time


 


Penicillins Allergy  Anaphylaxis Verified 23 16:25














Review of Systems





- Constitutional


Constitutional: denies: Fever, Chills, Malaise





- Eyes


Eyes: denies: Blurred vision, Spots in vision, Dipolpia





- Cardiovascular


Cariovascular: denies: Irregular heart rate, Palpitations, Chest pain





- Respiratory


Respiratory: denies: Cough, Wheezing, SOB at rest





- Gastrointestinal


Gastrointestinal: denies: Constipation, Diarrhea, Nausea, Vomiting





- Genitourinary


Genitourinary: denies: Dysuria, Frequency





- Integumentary


Integumentary: denies: Rash, Pruritis





- Neurological


Neurological: denies: Headache





- Psychiatric


Psychiatric: denies: Depression, Anxiety





- Hematologic/Lymphatic


Hematologic/Lymphatic: denies: Anemia





Physical





- Abdominal Exam


Vital Signs: 





                                        











Temp Pulse Resp BP Pulse Ox O2 Flow Rate


 


 36.5 C   95   20   126/74       


 


 24 11:39  24 11:39  24 11:39  24 11:39      











Contraction Frequency (min/apart): 2-4


Contraction Intensity: positive: Moderate


Uterine Resting Tone: positive: Soft





- Fetal Monitoring


Fetal Heart Rate Baseline: 140


Fetal Strip Review: positive: Category I





- Presentation


Presentation: positive: Vertex





- Vaginal Exam


Membranes: positive: Membranes ruptured


Dilation (in cm): deferred





- Speculum Exam


Speculum Exam Performed: positive: No


Findings: positive: Gross leak, Nitrazine





Plan for Labor





- Plan For Labor


I expect patient to be DC'd or transferred within 96 hours.: Yes


Plan for Labor: 





HPI: This 29yo  @ 38.6wks gestation by LMP c/w 10.3wk U/S presents to 

[facility] with c/o vaginal leakage of clear fluid which started at 0830 this morning

and has been followed by consistent contractions. She denies vaginal bleeding 

and she reports +FM. She is noted to be grossly ruptured with multiple pads 

saturated with clear vaginal fluid and nitrizine positive. SVE was deferred.


She has been a patient of [facility] for the duration of her 

pregnancy which has been complicated by hyperemesis gravidarum and has required 

multiple IV infusions for hydration. She has gained 42lbs in her pregnancy and 

secondary to hyperemesis she declined glucola screening for gestational diabetes

at 28wks gestation. She did track her blood glucose for 2 weeks and her values 

were reportedly all WNL. She will be admitted to [facility] for expectant management.





Dating criteria:


LMP 2023


Initial U/S @ 10.3wks c/w LMP dating


Serial exams - agree





OB/Gyn History: Term NSVB x 1.  SAB x1.  Last pap 2017 WNL, unsure if she had a 

pap smear postpartum.  Denies history of gonorrhea, chlamydia, genital herpes, 

oral herpes or any other STI.  Sexual partner does NOT have HSV (oral or 

genital).





Medical Hx: no significant





Surgical Hx: none


 


Social Hx: Monogamous with male partner


Stopped drinking alcohol due to pregnancy. Denies current use of tobacco, 

marijuana or other recreational drugs. Reports that she is safe in current 

relationship.





Family Hx: Denies family history of congenital anomalies, Cystic Fibrosis or 

chromosomal abnormalities.


 


Allergies: Penicillin - hives





Medications: Prochlorperzine suppositories PRN; Scopalamine patch PRN; 

Doxylamine tid; Pyridoxine (B6) tid; Zofran 4mg q 6 hrs





Prenatal course:


A negative, antibody negative


Rubella NON-IMMUNE, varicella immune


Hep B neg, Hep C neg


HIV non-reactive, RPR non-reactive


Initial U/S @ 10.3wks c/w LMP dating


NIPS - declined


FAS WNL with the exception of incomplete visualization of fetal cardiac 

structures and RVOT. Posterior placenta, no previa. Size c/w dating (EFW 

51%tile). 3VC. 


Completion FAS for fetal heart WNL.


Glucola - declined


Tdap - declined


Influenza - declined


COVID-19 -declined


RSV- declined


RHOGAM 2023


GBS negative





Physical exam:


Normocephalic, atraumatic


Heart RRR w/o M/G/R


Lungs CTAB


Abdomen gravid, soft, nontender. EFW 3500g


FHR baseline 140s, moderate variability, + accels, no decels


Contractions palpate moderate every 2-4 minutes with soft resting tone


SVE deferred 


Bilateral LE's trace edema 





Assessment:


29yo  @ 38.6wks gestation by LMP c/w 10.3wk U/S


Early labor


Vaginal leakage of fluid


GBS negative


FHR Category I





Plan:


Admit to [facility] for expectant management.


Continuous fetal monitoring.


Jacuzzi PRN. Nitrous oxide PRN.


Epidural per maternal request.


Anticipate .

## 2024-02-03 NOTE — DELIVERY NOTE
Delivery Note





- Labor


Labor: positive: Spontaneous, Augmented by oxytocin





- Infant Delivery Method


Infant Delivery Method: positive: Spontaneous vaginal delivery





- Birth Presentation


Birth Presentation: positive: Vertex





- Nuchal Cord


Nuchal Cord: positive: Present, Reduced





- Amniotic Fluid Description


Amniotic Fluid Description: positive: Clear





- Episiotomy Type


Episiotomy Type: positive: None





- Laceration


Laceration: positive: None





- Delivery Outcome


Delivery Outcome: positive: Livebirth





- 


Roosevelt: positive: Placed in direct skin contact with mother, Bulb syringe, 

Stimulated, Warmed, [place] used, Warmer used


Roosevelt sex: positive: Male





- Cord


Cord: positive: 3 vessels





- Placenta


Placenta: positive: Intact, Spontaneous





- Estimated Blood Loss


Estimated Blood Loss (in cc): 200





- Post Delivery Events


Post Delivery Events: positive: No post delivery events





- Delivery Comments (Free Text/Narrative)


Delivery Comments (Free Text/Narrative): 





Labor: This 29yo  @ 38.6wks gestation by LMP c/w 10.3wk U/S presented to 

[facility] with c/o vaginal leakage of clear fluid. She was noted to have grossly 

ruptured membranes. SVE 5/100/-2 and vertex. FHR pattern demonstrated Category I

pattern through the majority of labor with intermittent periods of Category II 

however overall remained reassuring. Epidural was placed per maternal request. 

Pitocin was initiated for augmentation of labor for a maximum infusion rate of 

7mU/mL. Normal labor course. Pt progressed to c/c/+1 1854.


Birth: Normal SVB of viable male infant on 2024 @ 1937. Nuchal cord x 2 

was reduced. The  was placed on maternal abdomen, stimulated, dried, and 

placed skin to skin. Apgar's were 8/8 at 1 and 5 minutes respectively. Pitocin 

administered via IV for hemostasis. The umbilical cord was allowed to stop 

pulsating at which time it was doubly clamped by CNM and cut by FOB. Cord blood 

was obtained. Fundal massage and gentle cord traction applied for active 

management of the third stage. EBL 200mL.


Fourth stage: Uterine fundus firm and there is no excessive bleeding. The 

perineum, vagina, and cervix were inspected and found to be intact. Skin to skin

contact initiated. Family bonding well. Both mother and baby were left in stable

condition.

## 2024-02-03 NOTE — PROVIDER PROGRESS NOTE
- HPI


Chief Complaint: Labor Check


Current Pregnancy: 


                                                               Current Pregnancy





EDU                              24


Gestation                        38 Weeks and 6 Days


                          4


Para                             1





Vital Signs











Temperature  36.5 C   24 11:37


 


Heart Rate  107 H  24 11:37


 


Respiratory Rate  20   24 11:37


 


Blood Pressure  126/74   24 11:37




















Temperature  36.5 C   24 11:39


 


Heart Rate  95   24 11:39


 


Respiratory Rate  24 11:39


 


Blood Pressure  126/74   24 11:39


 


O2 Saturation      


 


If not protocol: Oxygen Flow, liters/minute      














- Procedures


OB Procedure Performed: NST


Diagnosis/Indication for NST: Other


NST Procedure: 


NST Procedure





Start Date                       24


Start Time                       11:30


Stop Time                        12:10


Vibroacoustic Stimulation Used   No


Patient States Fetal Movement    Yes











- Plan


Plan: 





Rigoberto presents to [facility] with c/o contractions and leakage of clear vaginal fluid

that began at 0830 this morning and has persistently continued to leak clear 

fluid since that time. She reports the contractions have gradually increased in 

frequency and intensity since that time. She denies vaginal bleeding and reports

+FM. She is supported by her partner Lance and friend Brittany.





NST reactive. FHR 140s, moderate variability, + accels, no decels


Contractions palpate moderate every 2-4 minutes with soft resting tone





Grossly ruptured membranes.


Nitrizine positive





Assessment: 29yo  @ 38.6wks gestation


Early labor





Plan:


Admit to [facility] for expectant management now.

## 2024-02-04 VITALS — DIASTOLIC BLOOD PRESSURE: 67 MMHG | SYSTOLIC BLOOD PRESSURE: 111 MMHG | OXYGEN SATURATION: 98 %

## 2024-02-04 RX ADMIN — OXYCODONE ONE MG: 5 TABLET ORAL at 08:58

## 2024-02-04 RX ADMIN — NAPROXEN PRN MG: 250 TABLET ORAL at 14:14

## 2024-02-04 RX ADMIN — HUMAN RHO(D) IMMUNE GLOBULIN ONE MCG: 1500 SOLUTION INTRAMUSCULAR; INTRAVENOUS at 20:20

## 2024-02-04 NOTE — DISCHARGE PLAN
Discharge Plan


Problem Reviewed?: Yes


Disposition: 01 Home, Self Care


Condition: Good


Diet: Regular


Activity Restrictions: No Restrictions


Shower Restrictions: No


Driving Restrictions: No


Weight Bearing: Full Weight


Instruction Topics:  Birth Vaginal After


No Smoking: If you smoke, Please STOP!  Call [phone] for help.


Follow-up with: 


Daxa Mckeon CNM, ARNP [Provider Admit Priv/Credential] - 1 Week

## 2024-02-04 NOTE — DISCHARGE SUMMARY
Discharge Summary


Condition at Discharge: Good


Discharge Disposition: 01 Home, Self Care





- HOSPITAL COURSE


Hospital Course: 





Date of Admission: 2024





Date of Discharge: 2024





Diagnosis on Admission:


1. 29yo  @ 38.6wks gestation by LMP c/w 10.3wk U/S


2. Early labor


3. Vaginal leakage of fluid


4. GBS negative


5. FHR Category I





Diagnosis on Discharge:


1. 29yo  PPD#1 s/p TSVB viable male infant


2. Rh negative s/p Rhogam


3. Breastfeeding


4. Normal recovery





Brief History: She is a patient of [facility] who presented on 

2024 with c/o vaginal leakage of clear fluid and contractions. Cervix was 

5/100/-2. Pitocin was initiated for augmentation of labor for a maximum infusion

rate of 7mU/mL. Epidural was placed per maternal request. Pt progressed to 

spontaneously deliver a viable male infant on 2024 @ 1937 following a 49 

minute second stage and over intact perineum. EBL 200mL.


She has been doing well in her postpartum course. She is ambulating a tolerating

a regular diet. She is urinating without difficulty and her lochia is normal. 

Her pain is well controlled with oral medications. She will be discharged home 

today on postpartum day #1 with instructions to continue taking her prenatal 

vitamin while breastfeeding and to continue taking tylenol and Naproxen for pain

management. She has received 1 dose of Rhogam secondary to infant Rh positive 

status. She intends to follow up with myself in 1 week for routine postpartum 

visit or sooner if needed. She has been given precautions to call if she has any

worsening fevers, chills, abdominal pain, increased bleeding or foul smelling 

vaginal lochia.








Physical exam:


Normocephalic, atraumatic. Heart RRR w/o M/G/R, lungs CTAB, abdomen soft and 

nontender with fundus firm at U, perineum intact, light lochia rubra, bilateral 

LE's trace edema.





- ALLERGIES


Allergies/Adverse Reactions: 


                                    Allergies











Allergy/AdvReac Type Severity Reaction Status Date / Time


 


Penicillins Allergy  Anaphylaxis Verified 23 16:25














- MEDICATIONS


Home Medications: 


                                Ambulatory Orders











 Medication  Instructions  Recorded  Confirmed


 


hydrOXYzine pamoate [Hydroxyzine 50 mg PO QPM PRN 24





Pamoate]   














- LABS


Result Diagrams: 


                                 24 13:00

## 2024-02-05 NOTE — LABOR FLOWSHEET
===================================

Labor Flowsheet

===================================

Datetime Report Generated by CPN: 02/05/2024 02:20

   

   

===========================

Datetime: 02/04/2024 20:15

===========================

   

   

===================================

VITAL SIGNS

===================================

   

 NBP Sys/Dottie/Mean (mmHg):  111

:  67

:  76

 Pulse:  83

   

===========================

Datetime: 02/03/2024 23:14

===========================

   

 SpO2 (%):  97

   

===========================

Datetime: 02/03/2024 20:45

===========================

   

   

===================================

PAIN

===================================

   

 Pain Scale:  1

 Pain Presence:  Intermittent

   

===========================

Datetime: 02/03/2024 19:54

===========================

   

Stage of Pregnancy:  Recovery

 Temperature (C):  36.7

 Temperature Route:  Oral

   

===========================

Datetime: 02/03/2024 19:52

===========================

   

 LaborFlag:  Labor

   

===========================

Datetime: 02/03/2024 19:50

===========================

   

 Anesthesia Comments:  Epidural off

   

===========================

Datetime: 02/03/2024 19:43

===========================

   

   

===================================

MEDICATIONS

===================================

   

 Pitocin (milliunits):  Increased to @ 999

   

===========================

Datetime: 02/03/2024 19:37

===========================

   

 Stage 2 Comments:  male

   

===========================

Datetime: 02/03/2024 19:25

===========================

   

 Frequency (min):  2-3

 Duration (sec):  60-70

   

===================================

FETAL ASSESSMENT A

===================================

   

 Monitor Mode:  External US

 FHR Baseline Changes:  No Baseline Change

 Variability:  Moderate 6-25 bpm

 Accelerations:  None

 Decelerations:  Variable

 Category:  Category II

 Comments:  RN at bedside pt pushing

   

===========================

Datetime: 02/03/2024 19:15

===========================

   

   

===================================

COMMUNICATION

===================================

   

 Communication:  RN at Bedside; RN Reviewed Strip

   

===========================

Datetime: 02/03/2024 19:10

===========================

   

   

===================================

UTERINE ACTIVITY

===================================

   

 Monitor Mode:  External

 Quality:  Strong

 Pattern:  Normal: <= 5 Contractions in 10 Minutes

 Resting Tone (Palpate):  Relaxed

 FHR Baseline Rate :  140

   

===========================

Datetime: 02/03/2024 18:58

===========================

   

 Station:  0

 Exam by:  Linda, CNM

   

===========================

Datetime: 02/03/2024 18:56

===========================

   

 Medication Comments:  per provider

   

===========================

Datetime: 02/03/2024 18:54

===========================

   

   

===================================

VAGINAL EXAM

===================================

   

 Dilatation (cm):  10.0

 Effacement (%):  100

 I/O Interventions:  Devine Discontinued

   

===========================

Datetime: 02/03/2024 18:48

===========================

   

   

===================================

STAGE 2

===================================

   

 Pushing Position:  Pushing with Contractions

   

===========================

Datetime: 02/03/2024 18:37

===========================

   

 Patient Care Comments:  semi cruz, lithotomy position

   

===========================

Datetime: 02/03/2024 18:29

===========================

   

   

===================================

PATIENT CARE

===================================

   

 Patient Position/Activity:  Right Lateral

   

===========================

Datetime: 02/03/2024 15:01

===========================

   

 Pain Goal:  4

 Anesthesia Level Check:  T10- Umbilicus

   

===========================

Datetime: 02/03/2024 15:00

===========================

   

 Contraction Comments:  coupling

   

===========================

Datetime: 02/03/2024 14:27

===========================

   

 Epidural Procedure:  Test Dose

   

===========================

Datetime: 02/03/2024 13:57

===========================

   

   

===================================

ANESTHESIA

===================================

   

 Anesthesia Plans:  Local

   

===========================

Datetime: 02/03/2024 13:14

===========================

   

 Vaginal Bleeding:  None

 Cervix, Consistency:  Soft

 Cervix, Position:  Midposition

   

===========================

Datetime: 01/02/2024 19:48

===========================

   

 Membranes Ruptured Date/Time:  02/03/2024 08:30

 Membranes Rupture Method:  Spontaneous

 Amniotic Fluid Color:  Clear

 Amniotic Fluid Amount:  Moderate

 Amniotic Fluid Odor:  Normal

   

===========================

Datetime: 12/27/2023 17:35

===========================

   

 Monitor Interventions for FHR:  Ultrasound Adjusted